# Patient Record
Sex: FEMALE | Race: WHITE | NOT HISPANIC OR LATINO | Employment: OTHER | URBAN - NONMETROPOLITAN AREA
[De-identification: names, ages, dates, MRNs, and addresses within clinical notes are randomized per-mention and may not be internally consistent; named-entity substitution may affect disease eponyms.]

---

## 2017-01-01 ENCOUNTER — HOSPITAL ENCOUNTER (OUTPATIENT)
Dept: PHYSICAL THERAPY | Facility: HOSPITAL | Age: 72
Setting detail: THERAPIES SERIES
Discharge: HOME | End: 2017-01-19
Attending: ORTHOPAEDIC SURGERY | Admitting: ORTHOPAEDIC SURGERY

## 2017-01-27 ENCOUNTER — OFFICE VISIT (OUTPATIENT)
Dept: ORTHOPEDIC SURGERY | Facility: CLINIC | Age: 72
End: 2017-01-27

## 2017-01-27 VITALS — WEIGHT: 225 LBS | BODY MASS INDEX: 39.87 KG/M2 | HEIGHT: 63 IN

## 2017-01-27 DIAGNOSIS — Z96.651 STATUS POST TOTAL RIGHT KNEE REPLACEMENT: Primary | ICD-10-CM

## 2017-01-27 PROCEDURE — 99024 POSTOP FOLLOW-UP VISIT: CPT | Performed by: ORTHOPAEDIC SURGERY

## 2017-01-27 NOTE — MR AVS SNAPSHOT
Kelly Alvarez   1/27/2017 10:15 AM   Office Visit    Dept Phone:  [phone]   Encounter #:  [id number]    Provider:  Bonilla Fox MD   Department:  [facility] ORTHOPEDICS                Your Full Care Plan              Today's Medication Changes          These changes are accurate as of: 1/27/17 11:12 AM.  If you have any questions, ask your nurse or doctor.               Stop taking medication(s)listed here:     FLUVIRIN suspension injection   Generic drug:  Influenza Vac Typ A&B Surf Ant   Stopped by:  Bonilla Fox MD           LYRICA 50 MG capsule   Generic drug:  pregabalin   Stopped by:  Bonilla Fox MD           Naloxegol Oxalate 25 MG tablet   Commonly known as:  MOVANTIK   Stopped by:  Bonilla Fox MD           warfarin 3 MG tablet   Commonly known as:  COUMADIN   Stopped by:  Bonilla Fox MD                      Your Updated Medication List          This list is accurate as of: 1/27/17 11:12 AM.  Always use your most recent med list.                amitriptyline 50 MG tablet   Commonly known as:  ELAVIL       aspirin 81 MG chewable tablet       CloNIDine 0.3 MG tablet   Commonly known as:  CATAPRES       COENZYME Q-10 PO       furosemide 40 MG tablet   Commonly known as:  LASIX       gabapentin 300 MG capsule   Commonly known as:  NEURONTIN       HYDROcodone-acetaminophen  MG per tablet   Commonly known as:  NORCO       lisinopril 20 MG tablet   Commonly known as:  PRINIVIL,ZESTRIL       metFORMIN 500 MG tablet   Commonly known as:  GLUCOPHAGE       nystatin-triamcinolone 415837-5.1 UNIT/GM-% cream   Commonly known as:  MYCOLOG II       OMEPRAZOLE PO       PEDIATRIC MULTIVITAMINS-FL PO               You Were Diagnosed With        Codes Comments    Status post total right knee replacement    -  Primary ICD-10-CM: Z96.651  ICD-9-CM: V43.65       Instructions     None    Patient Instructions History      Upcoming Appointments     Visit Type Date  "Time Department    FOLLOW UP 1/27/2017 10:15 AM [facility]THOP[facility]    FOLLOW UP 2/13/2017 11:10 AM [facility] PAIN [facility]    FOLLOW UP 7/25/2017  9:00 AM [facility] ORTHOPEDICS [facility]      MyChart Signup     Our records indicate that you have declined [facility] signup. If you would like to sign up for [facility], please email [email or web address] or call [phone] to obtain an activation code.             Other Info from Your Visit           Your Appointments     Feb 13, 2017 11:10 AM CST   Follow Up with Demar Napier MD   [facility] PAIN MANAGEMENT (--)    [address]  [address] [place] [id number] [phone]           Arrive 15 minutes prior to appointment.            Jul 25, 2017  9:00 AM CDT   Follow Up with Bonilla Fox MD   [facility] ORTHOPEDICS (--)    [address]  [address] [facility] [address] [phone]           Arrive 15 minutes prior to appointment.              Allergies     Augmentin [Amoxicillin-pot Clavulanate]      Codeine      Morphine And Related      Penicillins      Zanaflex [Tizanidine Hcl]        Reason for Visit     Right Knee - Follow-up, Post-op           Vital Signs     Height Weight Body Mass Index Smoking Status          63\" (160 cm) 225 lb (102 kg) 39.86 kg/m2 Former Smoker        Problems and Diagnoses Noted     Status post total right knee replacement        "

## 2017-01-27 NOTE — PROGRESS NOTES
Subjective   Kelly Alvarez is a 71 y.o. female. 1945      History of Present Illness Patient is here for a post op recheck of Right TKA. Patient is S/P Right total knee-11/21/2016. Patient states she is doing well and is not in pain as long as she takes her pain medication    The following portions of the patient's history were reviewed and updated as appropriate:   She  has a past medical history of Diabetes mellitus; Diabetic foot ulcer; Hip pain; Hyperlipidemia; Hypertensive disorder; Hypertensive disorder; Knee pain; Osteoarthritis; and Shoulder pain.  She  does not have any pertinent problems on file.  She  has no past surgical history on file.  Her family history includes Diabetes in her other; Heart disease in her other; Hypertension in her other.  She  reports that she has quit smoking. She does not have any smokeless tobacco history on file. She reports that she does not drink alcohol or use illicit drugs.  Current Outpatient Prescriptions   Medication Sig Dispense Refill   • amitriptyline (ELAVIL) 50 MG tablet Take 50 mg by mouth every night.     • aspirin 81 MG chewable tablet Chew 81 mg daily.     • cloNIDine (CATAPRES) 0.3 MG tablet Take 0.3 mg by mouth 2 (two) times a day.     • COENZYME Q-10 PO Take  by mouth.     • furosemide (LASIX) 40 MG tablet Take 40 mg by mouth every morning.     • gabapentin (NEURONTIN) 300 MG capsule      • HYDROcodone-acetaminophen (NORCO)  MG per tablet Take 1 tablet by mouth 2 (two) times a day.     • lisinopril (PRINIVIL,ZESTRIL) 20 MG tablet Take 40 mg by mouth daily.     • metFORMIN (GLUCOPHAGE) 500 MG tablet Take 500 mg by mouth 2 (two) times a day with meals.     • nystatin-triamcinolone (MYCOLOG II) 523249-9.1 UNIT/GM-% cream      • OMEPRAZOLE PO Take 20 mg by mouth as needed.     • PEDIATRIC MULTIVITAMINS-FL PO Take  by mouth.       No current facility-administered medications for this visit.      Current Outpatient Prescriptions on File Prior to Visit  "  Medication Sig   • amitriptyline (ELAVIL) 50 MG tablet Take 50 mg by mouth every night.   • aspirin 81 MG chewable tablet Chew 81 mg daily.   • cloNIDine (CATAPRES) 0.3 MG tablet Take 0.3 mg by mouth 2 (two) times a day.   • COENZYME Q-10 PO Take  by mouth.   • furosemide (LASIX) 40 MG tablet Take 40 mg by mouth every morning.   • gabapentin (NEURONTIN) 300 MG capsule    • HYDROcodone-acetaminophen (NORCO)  MG per tablet Take 1 tablet by mouth 2 (two) times a day.   • lisinopril (PRINIVIL,ZESTRIL) 20 MG tablet Take 40 mg by mouth daily.   • metFORMIN (GLUCOPHAGE) 500 MG tablet Take 500 mg by mouth 2 (two) times a day with meals.   • nystatin-triamcinolone (MYCOLOG II) 334500-5.1 UNIT/GM-% cream    • OMEPRAZOLE PO Take 20 mg by mouth as needed.   • PEDIATRIC MULTIVITAMINS-FL PO Take  by mouth.   • [DISCONTINUED] FLUVIRIN suspension injection    • [DISCONTINUED] LYRICA 50 MG capsule Take 50 mg by mouth 3 (three) times a day.   • [DISCONTINUED] Naloxegol Oxalate (MOVANTIK) 25 MG tablet Take 1 tablet by mouth daily.   • [DISCONTINUED] warfarin (COUMADIN) 3 MG tablet      No current facility-administered medications on file prior to visit.      She is allergic to augmentin [amoxicillin-pot clavulanate]; codeine; morphine and related; penicillins; and zanaflex [tizanidine hcl]..    Review of Systems  Visit Vitals   • Ht 63\" (160 cm)   • Wt 225 lb (102 kg)   • BMI 39.86 kg/m2       Social History     Social History   • Marital status:      Spouse name: N/A   • Number of children: N/A   • Years of education: N/A     Occupational History   • Not on file.     Social History Main Topics   • Smoking status: Former Smoker   • Smokeless tobacco: Not on file      Comment: Quit about 17 yrs ago 5/13/16   • Alcohol use No   • Drug use: No   • Sexual activity: Defer     Other Topics Concern   • Not on file     Social History Narrative       HEENT: Normocephalic.  PERRLA.  TM's clear bilaterally.  Oropharynx: Clear.  " Neck: Supple, with no adenopathy.  Chest: Equal bilateral expansion.  Clear to auscultation and percussion.  Heart: Regular sinus rhythm, S1 and S2 normal.  No murmurs or extra heart sounds heard.  Abdomen: Soft, nontender, and no organomegaly.  Neurological: cranial nerves II-XII normal Vascular: pulses are present  Dermatological: no rashes  or blemishes, or any abnormality of the skin.      REVIEW OF SYSTEMS:  Negative, other than presenting complaint.  HEENT: No headaches, diplopia, blurred vision, tinnitus, vertigo, epistaxis, hoarseness or sore throat.  Pulmonary: No cough, sputum, hemoptysis, dyspnea, wheezing, or chest pain.  Cardiac: No chest pain, palpitations, orthopnea, paroxysmal nocturnal dyspnea, shortness of breath, or pedal edema.  Gastrointestinal: No diarrhea, melena, or constipation.  Genitourinary: No dysuria, hematuria, nocturia, frequency, bladder or bowel incontinence.  Hematology: No history of any anemia, fatigue, fever, or chills or night sweats.  Dermatology: No rashes, pruritus, or increased pigmentation changes of the skin.   Objective   Physical Examrange is from 0 to 130. Quads: 5/5  Neuro intact. Excellent stability.      Assessment/Plan rt total knee  Improved. See back in  6 months.    Kelly was seen today for follow-up and post-op.    Diagnoses and all orders for this visit:    Status post total right knee replacement

## 2017-03-02 ENCOUNTER — OFFICE VISIT (OUTPATIENT)
Dept: PAIN MEDICINE | Facility: CLINIC | Age: 72
End: 2017-03-02

## 2017-03-02 VITALS
SYSTOLIC BLOOD PRESSURE: 150 MMHG | WEIGHT: 235.1 LBS | DIASTOLIC BLOOD PRESSURE: 98 MMHG | HEIGHT: 63 IN | BODY MASS INDEX: 41.66 KG/M2

## 2017-03-02 DIAGNOSIS — M25.511 CHRONIC RIGHT SHOULDER PAIN: ICD-10-CM

## 2017-03-02 DIAGNOSIS — M25.552 PAIN OF BOTH HIP JOINTS: ICD-10-CM

## 2017-03-02 DIAGNOSIS — M25.551 PAIN OF BOTH HIP JOINTS: ICD-10-CM

## 2017-03-02 DIAGNOSIS — G89.29 CHRONIC PAIN OF BOTH KNEES: Primary | ICD-10-CM

## 2017-03-02 DIAGNOSIS — Z79.899 HIGH RISK MEDICATIONS (NOT ANTICOAGULANTS) LONG-TERM USE: ICD-10-CM

## 2017-03-02 DIAGNOSIS — G89.29 CHRONIC RIGHT SHOULDER PAIN: ICD-10-CM

## 2017-03-02 DIAGNOSIS — M25.561 CHRONIC PAIN OF BOTH KNEES: Primary | ICD-10-CM

## 2017-03-02 DIAGNOSIS — M25.562 CHRONIC PAIN OF BOTH KNEES: Primary | ICD-10-CM

## 2017-03-02 PROCEDURE — 99213 OFFICE O/P EST LOW 20 MIN: CPT | Performed by: PAIN MEDICINE

## 2017-03-02 RX ORDER — HYDROCODONE BITARTRATE AND ACETAMINOPHEN 10; 325 MG/1; MG/1
1 TABLET ORAL 4 TIMES DAILY
Qty: 120 TABLET | Refills: 0 | Status: SHIPPED | OUTPATIENT
Start: 2017-03-02 | End: 2017-04-01

## 2017-03-02 NOTE — PROGRESS NOTES
Kelly Alvarez is a 71 y.o. female.   1945    HPI:   Location: bilateral hip, bilateral knee and bilateral foot  Quality: aching, sharp, dull and no tingling  Severity: 5-6/10  Timing: constant  Alleviating: pain medication  Aggravating: increased activity     Still recovering from tkr in November 16.  Doing well.  Patient reports that the opioid medication still provides her good relief and allows increased activity than she would have without the opioid medication.  She denies side effects.  The right shoulder is still bothersome, Dr. Fox is following.        The following portions of the patient's history were reviewed by me and updated as appropriate: allergies, current medications, past family history, past medical history, past social history, past surgical history and problem list.    Past Medical History   Diagnosis Date   • Diabetes mellitus    • Diabetic foot ulcer      Normal PRIYA      • Hip pain    • Hyperlipidemia    • Hypertensive disorder    • Hypertensive disorder    • Knee pain    • Osteoarthritis    • Shoulder pain        Social History     Social History   • Marital status:      Spouse name: N/A   • Number of children: N/A   • Years of education: N/A     Occupational History   • Not on file.     Social History Main Topics   • Smoking status: Former Smoker   • Smokeless tobacco: Not on file      Comment: Quit about 17 yrs ago 5/13/16   • Alcohol use No   • Drug use: No   • Sexual activity: Defer     Other Topics Concern   • Not on file     Social History Narrative       Family History   Problem Relation Age of Onset   • Diabetes Other    • Heart disease Other    • Hypertension Other          Current Outpatient Prescriptions:   •  amitriptyline (ELAVIL) 50 MG tablet, Take 50 mg by mouth every night., Disp: , Rfl:   •  aspirin 81 MG chewable tablet, Chew 81 mg daily., Disp: , Rfl:   •  cloNIDine (CATAPRES) 0.3 MG tablet, Take 0.3 mg by mouth 2 (two) times a day., Disp: , Rfl:   •   COENZYME Q-10 PO, Take  by mouth., Disp: , Rfl:   •  furosemide (LASIX) 40 MG tablet, Take 40 mg by mouth every morning., Disp: , Rfl:   •  gabapentin (NEURONTIN) 300 MG capsule, , Disp: , Rfl:   •  HYDROcodone-acetaminophen (NORCO)  MG per tablet, Take 1 tablet by mouth 2 (two) times a day., Disp: , Rfl:   •  lisinopril (PRINIVIL,ZESTRIL) 20 MG tablet, Take 40 mg by mouth daily., Disp: , Rfl:   •  metFORMIN (GLUCOPHAGE) 500 MG tablet, Take 500 mg by mouth 2 (two) times a day with meals., Disp: , Rfl:   •  nystatin-triamcinolone (MYCOLOG II) 248337-2.1 UNIT/GM-% cream, , Disp: , Rfl:   •  OMEPRAZOLE PO, Take 20 mg by mouth as needed., Disp: , Rfl:   •  PEDIATRIC MULTIVITAMINS-FL PO, Take  by mouth., Disp: , Rfl:   •  HYDROcodone-acetaminophen (NORCO)  MG per tablet, Take 1 tablet by mouth 4 (Four) Times a Day for 30 days., Disp: 120 tablet, Rfl: 0  •  HYDROcodone-acetaminophen (NORCO)  MG per tablet, Take 1 tablet by mouth 4 (Four) Times a Day for 30 days., Disp: 120 tablet, Rfl: 0    Allergies   Allergen Reactions   • Augmentin [Amoxicillin-Pot Clavulanate]    • Codeine    • Morphine And Related    • Penicillins    • Zanaflex [Tizanidine Hcl]          Review of Systems   Musculoskeletal:        Bilateral hip pain  Bilateral knee pain  Bilateral foot pain       10 system review of systems was reviewed and negative except for above.    Physical Exam   Constitutional: She appears well-developed and well-nourished. No distress.   Musculoskeletal:        Right shoulder: She exhibits decreased range of motion (decr arom right shoulder with pain).   Painful arom b knees.   Neurological: She is alert.   Cane for ambulation   Psychiatric: She has a normal mood and affect. Her behavior is normal. Judgment normal.       Kelly was seen today for shoulder pain, foot pain, knee pain and hip pain.    Diagnoses and all orders for this visit:    Chronic pain of both knees    Chronic right shoulder pain    Pain of  both hip joints    High risk medications (not anticoagulants) long-term use    Other orders  -     HYDROcodone-acetaminophen (NORCO)  MG per tablet; Take 1 tablet by mouth 4 (Four) Times a Day for 30 days.  -     HYDROcodone-acetaminophen (NORCO)  MG per tablet; Take 1 tablet by mouth 4 (Four) Times a Day for 30 days.        Medication: Patient reports no negative side effects, Patient reports appropriate usage and storage habits, Patient's opioid provides enough reflief to be more active and perform activities of daily living with less discomfort. and Refill opioid medication as above.     Interventional: none at this time.  Pt reports Dr. Fox is going to be evaluating r shoulder soon.    Rehab: none at this time    Behavioral: No aberrant behavior noted. [facility] Report #[id number]  was reviewed and is consistent with stated history    Urine drug screen None at this time          This document has been electronically signed by Demar Napier MD on March 2, 2017 9:08 AM

## 2017-04-26 ENCOUNTER — OFFICE VISIT (OUTPATIENT)
Dept: PAIN MEDICINE | Facility: CLINIC | Age: 72
End: 2017-04-26

## 2017-04-26 VITALS
DIASTOLIC BLOOD PRESSURE: 100 MMHG | BODY MASS INDEX: 41.55 KG/M2 | WEIGHT: 234.5 LBS | SYSTOLIC BLOOD PRESSURE: 180 MMHG | HEIGHT: 63 IN

## 2017-04-26 DIAGNOSIS — M25.561 CHRONIC PAIN OF BOTH KNEES: Primary | ICD-10-CM

## 2017-04-26 DIAGNOSIS — M25.551 PAIN OF BOTH HIP JOINTS: ICD-10-CM

## 2017-04-26 DIAGNOSIS — M25.552 PAIN OF BOTH HIP JOINTS: ICD-10-CM

## 2017-04-26 DIAGNOSIS — G89.29 CHRONIC PAIN OF BOTH KNEES: Primary | ICD-10-CM

## 2017-04-26 DIAGNOSIS — M25.511 RIGHT SHOULDER PAIN, UNSPECIFIED CHRONICITY: ICD-10-CM

## 2017-04-26 DIAGNOSIS — Z79.899 HIGH RISK MEDICATIONS (NOT ANTICOAGULANTS) LONG-TERM USE: ICD-10-CM

## 2017-04-26 DIAGNOSIS — M25.562 CHRONIC PAIN OF BOTH KNEES: Primary | ICD-10-CM

## 2017-04-26 PROCEDURE — 99213 OFFICE O/P EST LOW 20 MIN: CPT | Performed by: NURSE PRACTITIONER

## 2017-04-26 NOTE — PROGRESS NOTES
Kelly Alvarez is a 71 y.o. female.   1945    HPI:   Location: bilateral hip, bilateral knee and bilateral foot  Quality: aching, sharp and dull  Severity: 6/10  Timing: constant  Alleviating: pain medication  Aggravating: increased activity     Pt states Dr. Fox will be evaluating right shoulder in July. Pt remains recovering from TRK replacement in NOV 2016. Opiate medications provides enough relief for daily activity and ambulation. NO SE. Pt happy with pain management visit and regimen.           The following portions of the patient's history were reviewed by me and updated as appropriate: allergies, current medications, past family history, past medical history, past social history, past surgical history and problem list.    Past Medical History:   Diagnosis Date   • Diabetes mellitus    • Diabetic foot ulcer     Normal PRIYA      • Hip pain    • Hyperlipidemia    • Hypertensive disorder    • Hypertensive disorder    • Knee pain    • Osteoarthritis    • Shoulder pain        Social History     Social History   • Marital status:      Spouse name: N/A   • Number of children: N/A   • Years of education: N/A     Occupational History   • Not on file.     Social History Main Topics   • Smoking status: Former Smoker   • Smokeless tobacco: Never Used      Comment: Quit about 17 yrs ago 5/13/16   • Alcohol use No   • Drug use: No   • Sexual activity: Defer     Other Topics Concern   • Not on file     Social History Narrative       Family History   Problem Relation Age of Onset   • Diabetes Other    • Heart disease Other    • Hypertension Other          Current Outpatient Prescriptions:   •  amitriptyline (ELAVIL) 50 MG tablet, Take 50 mg by mouth every night., Disp: , Rfl:   •  aspirin 81 MG chewable tablet, Chew 81 mg daily., Disp: , Rfl:   •  cloNIDine (CATAPRES) 0.3 MG tablet, Take 0.3 mg by mouth 2 (two) times a day., Disp: , Rfl:   •  COENZYME Q-10 PO, Take  by mouth., Disp: , Rfl:   •  furosemide  (LASIX) 40 MG tablet, Take 40 mg by mouth every morning., Disp: , Rfl:   •  gabapentin (NEURONTIN) 300 MG capsule, , Disp: , Rfl:   •  HYDROcodone-acetaminophen (NORCO)  MG per tablet, Take 1 tablet by mouth 2 (two) times a day., Disp: , Rfl:   •  lisinopril (PRINIVIL,ZESTRIL) 20 MG tablet, Take 40 mg by mouth daily., Disp: , Rfl:   •  metFORMIN (GLUCOPHAGE) 500 MG tablet, Take 500 mg by mouth 2 (two) times a day with meals., Disp: , Rfl:   •  nystatin-triamcinolone (MYCOLOG II) 742469-3.1 UNIT/GM-% cream, , Disp: , Rfl:   •  OMEPRAZOLE PO, Take 20 mg by mouth as needed., Disp: , Rfl:   •  PEDIATRIC MULTIVITAMINS-FL PO, Take  by mouth., Disp: , Rfl:     Allergies   Allergen Reactions   • Augmentin [Amoxicillin-Pot Clavulanate]    • Codeine    • Morphine And Related    • Penicillins    • Zanaflex [Tizanidine Hcl]          Review of Systems   Musculoskeletal:        B.hip,b.knee,b.foot     10 system review of systems was reviewed and negative except for above.    Physical Exam   Constitutional: She is oriented to person, place, and time. She appears well-developed and well-nourished. No distress.   Musculoskeletal:        Right shoulder: She exhibits decreased range of motion ( less than 90 deg abd) and tenderness.        Right knee: She exhibits normal range of motion. Tenderness found.        Left knee: She exhibits normal range of motion. Tenderness found.   Painful arom b knees.   Neurological: She is alert and oriented to person, place, and time. Gait ( cane for ambulation) abnormal.   Cane for ambulation   Skin: Skin is warm and dry.   Psychiatric: She has a normal mood and affect. Her behavior is normal. Judgment normal. She expresses no homicidal and no suicidal ideation.   Nursing note and vitals reviewed.      Kelly was seen today for pain.    Diagnoses and all orders for this visit:    Chronic pain of both knees    Right shoulder pain, unspecified chronicity    Pain of both hip joints    High risk  medications (not anticoagulants) long-term use        Medication: Patient reports no negative side effects, Patient reports appropriate usage and storage habits and Patient's opioid provides enough reflief to be more active and perform activities of daily living with less discomfort. Norco 10 mg qid. Discussed case with Dariel Napier, opiate medication refilled ×2 hand written scripts.      Interventional: Dr. Fox will follow up with right shoulder.    Rehab: Activity at home outside with walker.     Behavioral: No aberrant behavior noted. [facility] Report # [id number]  was reviewed and is consistent with stated history    Urine drug screen None at this time. UDS next visit.           This document has been electronically signed by MARGARITO Yin on April 26, 2017 6:36 PM          This document has been electronically signed by MARGARITO Yin on April 26, 2017 6:36 PM

## 2017-06-26 ENCOUNTER — OFFICE VISIT (OUTPATIENT)
Dept: PAIN MEDICINE | Facility: CLINIC | Age: 72
End: 2017-06-26

## 2017-06-26 ENCOUNTER — APPOINTMENT (OUTPATIENT)
Dept: LAB | Facility: HOSPITAL | Age: 72
End: 2017-06-26

## 2017-06-26 VITALS
WEIGHT: 230.9 LBS | HEIGHT: 64 IN | SYSTOLIC BLOOD PRESSURE: 160 MMHG | BODY MASS INDEX: 39.42 KG/M2 | DIASTOLIC BLOOD PRESSURE: 90 MMHG

## 2017-06-26 DIAGNOSIS — T40.2X5A THERAPEUTIC OPIOID INDUCED CONSTIPATION: ICD-10-CM

## 2017-06-26 DIAGNOSIS — G89.29 CHRONIC PAIN OF LEFT KNEE: Primary | ICD-10-CM

## 2017-06-26 DIAGNOSIS — M25.511 CHRONIC RIGHT SHOULDER PAIN: ICD-10-CM

## 2017-06-26 DIAGNOSIS — E66.01 MORBID OBESITY DUE TO EXCESS CALORIES (HCC): ICD-10-CM

## 2017-06-26 DIAGNOSIS — M25.562 CHRONIC PAIN OF LEFT KNEE: Primary | ICD-10-CM

## 2017-06-26 DIAGNOSIS — K59.03 THERAPEUTIC OPIOID INDUCED CONSTIPATION: ICD-10-CM

## 2017-06-26 DIAGNOSIS — G89.29 CHRONIC RIGHT SHOULDER PAIN: ICD-10-CM

## 2017-06-26 PROCEDURE — 80307 DRUG TEST PRSMV CHEM ANLYZR: CPT | Performed by: PAIN MEDICINE

## 2017-06-26 PROCEDURE — G0481 DRUG TEST DEF 8-14 CLASSES: HCPCS | Performed by: PAIN MEDICINE

## 2017-06-26 PROCEDURE — 99214 OFFICE O/P EST MOD 30 MIN: CPT | Performed by: PAIN MEDICINE

## 2017-06-26 RX ORDER — HYDROCODONE BITARTRATE AND ACETAMINOPHEN 10; 325 MG/1; MG/1
1 TABLET ORAL 4 TIMES DAILY
Qty: 120 TABLET | Refills: 0 | Status: SHIPPED | OUTPATIENT
Start: 2017-06-26 | End: 2017-07-26

## 2017-06-26 RX ORDER — OMEPRAZOLE 20 MG/1
CAPSULE, DELAYED RELEASE ORAL
COMMUNITY
Start: 2017-05-31

## 2017-06-26 RX ORDER — LEVOFLOXACIN 500 MG/1
TABLET, FILM COATED ORAL
COMMUNITY
Start: 2017-05-31 | End: 2019-05-20

## 2017-06-26 NOTE — PROGRESS NOTES
Kelly Alvarez is a 72 y.o. female.   1945    HPI:   Location: bilateral shoulder, bilateral hip, bilateral knee and right foot  Quality: aching, sharp and dull  Severity: 6/10  Timing: constant  Alleviating: pain medication  Aggravating: increased activity      TKR from November doing well.  Is trying to wait before having the left one done.  Patient reports that the opioid medication still provides her good relief and allows increased activity than she would have without the opioid medication.  She denies side effects, other than constipation managed with diet and otc meds, not perfectly however.      The following portions of the patient's history were reviewed by me and updated as appropriate: allergies, current medications, past family history, past medical history, past social history, past surgical history and problem list.    Past Medical History:   Diagnosis Date   • Diabetes mellitus    • Diabetic foot ulcer     Normal PRIYA      • Hip pain    • Hyperlipidemia    • Hypertensive disorder    • Hypertensive disorder    • Knee pain    • Osteoarthritis    • Shoulder pain        Social History     Social History   • Marital status:      Spouse name: N/A   • Number of children: N/A   • Years of education: N/A     Occupational History   • Not on file.     Social History Main Topics   • Smoking status: Former Smoker   • Smokeless tobacco: Never Used      Comment: Quit about 17 yrs ago 5/13/16   • Alcohol use No   • Drug use: No   • Sexual activity: Defer     Other Topics Concern   • Not on file     Social History Narrative       Family History   Problem Relation Age of Onset   • Diabetes Other    • Heart disease Other    • Hypertension Other          Current Outpatient Prescriptions:   •  amitriptyline (ELAVIL) 50 MG tablet, Take 50 mg by mouth every night., Disp: , Rfl:   •  aspirin 81 MG chewable tablet, Chew 81 mg daily., Disp: , Rfl:   •  cloNIDine (CATAPRES) 0.3 MG tablet, Take 0.3 mg by mouth 2  (two) times a day., Disp: , Rfl:   •  COENZYME Q-10 PO, Take  by mouth., Disp: , Rfl:   •  furosemide (LASIX) 40 MG tablet, Take 40 mg by mouth every morning., Disp: , Rfl:   •  gabapentin (NEURONTIN) 300 MG capsule, , Disp: , Rfl:   •  HYDROcodone-acetaminophen (NORCO)  MG per tablet, Take 1 tablet by mouth 2 (two) times a day., Disp: , Rfl:   •  levoFLOXacin (LEVAQUIN) 500 MG tablet, , Disp: , Rfl:   •  lisinopril (PRINIVIL,ZESTRIL) 20 MG tablet, Take 40 mg by mouth daily., Disp: , Rfl:   •  metFORMIN (GLUCOPHAGE) 1000 MG tablet, , Disp: , Rfl:   •  metFORMIN (GLUCOPHAGE) 500 MG tablet, Take 500 mg by mouth 2 (two) times a day with meals., Disp: , Rfl:   •  nystatin-triamcinolone (MYCOLOG II) 357960-4.1 UNIT/GM-% cream, , Disp: , Rfl:   •  omeprazole (priLOSEC) 20 MG capsule, , Disp: , Rfl:   •  PEDIATRIC MULTIVITAMINS-FL PO, Take  by mouth., Disp: , Rfl:   •  HYDROcodone-acetaminophen (NORCO)  MG per tablet, Take 1 tablet by mouth 4 (Four) Times a Day for 30 days., Disp: 120 tablet, Rfl: 0  •  HYDROcodone-acetaminophen (NORCO)  MG per tablet, Take 1 tablet by mouth 4 (Four) Times a Day for 30 days., Disp: 120 tablet, Rfl: 0  •  Naloxegol Oxalate (MOVANTIK) 25 MG tablet, Take 1 tablet by mouth Daily., Disp: 30 tablet, Rfl: 1    Allergies   Allergen Reactions   • Augmentin [Amoxicillin-Pot Clavulanate]    • Codeine    • Morphine And Related    • Penicillins    • Zanaflex [Tizanidine Hcl]        Review of Systems   Musculoskeletal:        Bilateral hip knee foot pain right shoulder pain   All other systems reviewed and are negative.    All systems reviewed and negative except for above.    Physical Exam   Constitutional: She appears well-developed and well-nourished. No distress.   Musculoskeletal:        Right shoulder: She exhibits decreased range of motion (decr arom right shoulder with pain abduction only abou 20 percent).   Painful arom l knee.   Neurological: She is alert.   Cane for  ambulation   Psychiatric: She has a normal mood and affect. Her behavior is normal. Judgment normal.       Kelly was seen today for hip pain, knee pain, foot pain and shoulder pain.    Diagnoses and all orders for this visit:    Chronic pain of left knee  -     ToxASSURE Select 13 (MW)    Chronic right shoulder pain  -     ToxASSURE Select 13 (MW)    Morbid obesity due to excess calories  -     ToxASSURE Select 13 (MW)    Therapeutic opioid induced constipation    Other orders  -     HYDROcodone-acetaminophen (NORCO)  MG per tablet; Take 1 tablet by mouth 4 (Four) Times a Day for 30 days.  -     HYDROcodone-acetaminophen (NORCO)  MG per tablet; Take 1 tablet by mouth 4 (Four) Times a Day for 30 days.  -     Naloxegol Oxalate (MOVANTIK) 25 MG tablet; Take 1 tablet by mouth Daily.        Medication: Patient reports appropriate usage and storage habits, Patient's opioid provides enough reflief to be more active and perform activities of daily living with less discomfort. and Refill opioid medication as above.  Constipation managed moderately with otc meds, would like to have movantik prescribed.  Has taken samples in past with good relief.  Has tried multiple otc meds with only modest result.  Miralax, colace, dulcolax have all been tried.     Interventional: none at this time.  Following with Dr. Fox regarding right shoulder and left knee at this point.     Rehab: none at this time    Behavioral: No aberrant behavior noted. [facility] Report #[id number]  was reviewed and is consistent with stated history    Urine drug screen Ordered today to test for drugs of abuse and prescribed medications          This document has been electronically signed by Demar Napier MD on June 26, 2017 11:28 AM

## 2017-07-04 LAB — CONV REPORT SUMMARY: NORMAL

## 2017-07-25 ENCOUNTER — OFFICE VISIT (OUTPATIENT)
Dept: ORTHOPEDIC SURGERY | Facility: CLINIC | Age: 72
End: 2017-07-25

## 2017-07-25 VITALS — WEIGHT: 225 LBS | HEIGHT: 63 IN | BODY MASS INDEX: 39.87 KG/M2

## 2017-07-25 DIAGNOSIS — Z96.651 STATUS POST TOTAL RIGHT KNEE REPLACEMENT: Primary | ICD-10-CM

## 2017-07-25 DIAGNOSIS — M17.12 PRIMARY OSTEOARTHRITIS OF LEFT KNEE: ICD-10-CM

## 2017-07-25 PROCEDURE — 99213 OFFICE O/P EST LOW 20 MIN: CPT | Performed by: ORTHOPAEDIC SURGERY

## 2017-07-25 NOTE — PROGRESS NOTES
Kelly Alvarez is a 72 y.o. female returns for     Chief Complaint   Patient presents with   • Right Knee - Follow-up       HISTORY OF PRESENT ILLNESS: Patient is S/P right TKA and doing well. Last Wednesday , while getting out of the shower, she felt her left total hip dislocate, and reduce itself , this is the first  Time this has happened since she had the hip revise in [place]  Several years ago , she is also having severe pain in the left knee , x rays done last December show severe OA of all three compartments of the left knee.         CONCURRENT MEDICAL HISTORY:    Past Medical History:   Diagnosis Date   • Diabetes mellitus    • Diabetic foot ulcer     Normal PRIYA      • Hip pain    • Hyperlipidemia    • Hypertensive disorder    • Hypertensive disorder    • Knee pain    • Osteoarthritis    • Shoulder pain        Allergies   Allergen Reactions   • Augmentin [Amoxicillin-Pot Clavulanate]    • Codeine    • Morphine And Related    • Penicillins    • Zanaflex [Tizanidine Hcl]          Current Outpatient Prescriptions:   •  amitriptyline (ELAVIL) 50 MG tablet, Take 50 mg by mouth every night., Disp: , Rfl:   •  aspirin 81 MG chewable tablet, Chew 81 mg daily., Disp: , Rfl:   •  cloNIDine (CATAPRES) 0.3 MG tablet, Take 0.3 mg by mouth 2 (two) times a day., Disp: , Rfl:   •  COENZYME Q-10 PO, Take  by mouth., Disp: , Rfl:   •  furosemide (LASIX) 40 MG tablet, Take 40 mg by mouth every morning., Disp: , Rfl:   •  gabapentin (NEURONTIN) 300 MG capsule, , Disp: , Rfl:   •  HYDROcodone-acetaminophen (NORCO)  MG per tablet, Take 1 tablet by mouth 2 (two) times a day., Disp: , Rfl:   •  HYDROcodone-acetaminophen (NORCO)  MG per tablet, Take 1 tablet by mouth 4 (Four) Times a Day for 30 days., Disp: 120 tablet, Rfl: 0  •  HYDROcodone-acetaminophen (NORCO)  MG per tablet, Take 1 tablet by mouth 4 (Four) Times a Day for 30 days., Disp: 120 tablet, Rfl: 0  •  levoFLOXacin (LEVAQUIN) 500 MG tablet, ,  "Disp: , Rfl:   •  lisinopril (PRINIVIL,ZESTRIL) 20 MG tablet, Take 40 mg by mouth daily., Disp: , Rfl:   •  metFORMIN (GLUCOPHAGE) 1000 MG tablet, , Disp: , Rfl:   •  metFORMIN (GLUCOPHAGE) 500 MG tablet, Take 500 mg by mouth 2 (two) times a day with meals., Disp: , Rfl:   •  Naloxegol Oxalate (MOVANTIK) 25 MG tablet, Take 1 tablet by mouth Daily., Disp: 30 tablet, Rfl: 1  •  nystatin-triamcinolone (MYCOLOG II) 688285-5.1 UNIT/GM-% cream, , Disp: , Rfl:   •  omeprazole (priLOSEC) 20 MG capsule, , Disp: , Rfl:   •  PEDIATRIC MULTIVITAMINS-FL PO, Take  by mouth., Disp: , Rfl:     No past surgical history on file.    ROS  No fevers or chills.  No chest pain or shortness of air.  No GI or  disturbances.    PHYSICAL EXAMINATION:       Ht 63\" (160 cm)  Wt 225 lb (102 kg)  BMI 39.86 kg/m2    Physical Exam     Ht 63\" (160 cm)  Wt 225 lb (102 kg)  BMI 39.86 kg/m2    Social History     Social History   • Marital status:      Spouse name: N/A   • Number of children: N/A   • Years of education: N/A     Occupational History   • Not on file.     Social History Main Topics   • Smoking status: Former Smoker   • Smokeless tobacco: Never Used      Comment: Quit about 17 yrs ago 5/13/16   • Alcohol use No   • Drug use: No   • Sexual activity: Defer     Other Topics Concern   • Not on file     Social History Narrative       HEENT: Normocephalic.  PERRLA.  TM's clear bilaterally.  Oropharynx: Clear.  Neck: Supple, with no adenopathy.  Chest: Equal bilateral expansion.  Clear to auscultation and percussion.  Heart: Regular sinus rhythm, S1 and S2 normal.  No murmurs or extra heart sounds heard.  Abdomen: Soft, nontender, and no organomegaly.  Neurological: cranial nerves II-XII normal Vascular: pulses are present  Dermatological: no rashes  or blemishes, or any abnormality of the skin.        GAIT:     []  Normal  [x]  Antalgic    Assistive device: []  None  []  Walker     []  Crutches  [x]  Cane     []  Wheelchair  []  " Stretcher    Ortho Exam  Severe arthrtitis  Changes  Of the left knee on x ray taken on 12/6/16. Severe crepitus on range of motion  Of the knee from 0 to 100 passive range of the left hip is painful on abduction and external rotation of the  Left hip  Quads: 4/5 neuro intact.        No results found.          ASSESSMENT: severe OA of the left knee.  Symptomatic left total hip with instance of apparent subluxation last week, plan: pre-cert for Synvisc injection of the left knee and  ssget x rays of the left hip today. See in 10 days after pre-cert is done.      Diagnoses and all orders for this visit:    Status post total right knee replacement  -     XR Hip With or Without Pelvis 2 - 3 View Left  -     XR Knee 4+ View Left    Primary osteoarthritis of left knee          PLAN    No Follow-up on file.    Bonilla Fox MD

## 2017-08-08 ENCOUNTER — OFFICE VISIT (OUTPATIENT)
Dept: ORTHOPEDIC SURGERY | Facility: CLINIC | Age: 72
End: 2017-08-08

## 2017-08-08 VITALS
BODY MASS INDEX: 36.32 KG/M2 | SYSTOLIC BLOOD PRESSURE: 148 MMHG | WEIGHT: 226 LBS | HEIGHT: 66 IN | DIASTOLIC BLOOD PRESSURE: 74 MMHG

## 2017-08-08 DIAGNOSIS — M75.41 SHOULDER IMPINGEMENT, RIGHT: Primary | ICD-10-CM

## 2017-08-08 PROCEDURE — 99213 OFFICE O/P EST LOW 20 MIN: CPT | Performed by: ORTHOPAEDIC SURGERY

## 2017-08-08 PROCEDURE — 20610 DRAIN/INJ JOINT/BURSA W/O US: CPT | Performed by: ORTHOPAEDIC SURGERY

## 2017-08-08 RX ORDER — TRIAMCINOLONE ACETONIDE 40 MG/ML
40 INJECTION, SUSPENSION INTRA-ARTICULAR; INTRAMUSCULAR ONCE
Status: COMPLETED | OUTPATIENT
Start: 2017-08-08 | End: 2017-08-08

## 2017-08-08 RX ORDER — BUPIVACAINE HYDROCHLORIDE 2.5 MG/ML
5 INJECTION, SOLUTION EPIDURAL; INFILTRATION; INTRACAUDAL ONCE
Status: COMPLETED | OUTPATIENT
Start: 2017-08-08 | End: 2017-08-08

## 2017-08-08 RX ADMIN — BUPIVACAINE HYDROCHLORIDE 5 ML: 2.5 INJECTION, SOLUTION EPIDURAL; INFILTRATION; INTRACAUDAL at 09:49

## 2017-08-08 RX ADMIN — TRIAMCINOLONE ACETONIDE 40 MG: 40 INJECTION, SUSPENSION INTRA-ARTICULAR; INTRAMUSCULAR at 09:49

## 2017-08-08 NOTE — PROGRESS NOTES
"Subjective   Kelly Alvarez is a 72 y.o. female. 1945- Right shoulder/knee pain      History of Present Illness   Patient is here with right shoulder and right knee pain. Patient states she has had severe pain to her right shoulder for \"quite awhile now\". Patient was suppose to be pre-cered for SYNVISC ONE- right knee to receive the injection during today's office visit. Unfortunately, the approval has not been yet obtained. Patient has requested a steroid injection to her right shoulder.     The following portions of the patient's history were reviewed and updated as appropriate:   She  has a past medical history of Diabetes mellitus; Diabetic foot ulcer; Hip pain; Hyperlipidemia; Hypertensive disorder; Hypertensive disorder; Knee pain; Osteoarthritis; and Shoulder pain.  She  does not have any pertinent problems on file.  She  has no past surgical history on file.  Her family history includes Diabetes in her other; Heart disease in her other; Hypertension in her other.  She  reports that she has quit smoking. She has never used smokeless tobacco. She reports that she does not drink alcohol or use illicit drugs.  Current Outpatient Prescriptions   Medication Sig Dispense Refill   • amitriptyline (ELAVIL) 50 MG tablet Take 50 mg by mouth every night.     • aspirin 81 MG chewable tablet Chew 81 mg daily.     • cloNIDine (CATAPRES) 0.3 MG tablet Take 0.3 mg by mouth 2 (two) times a day.     • COENZYME Q-10 PO Take  by mouth.     • furosemide (LASIX) 40 MG tablet Take 40 mg by mouth every morning.     • gabapentin (NEURONTIN) 300 MG capsule      • HYDROcodone-acetaminophen (NORCO)  MG per tablet Take 1 tablet by mouth 2 (two) times a day.     • levoFLOXacin (LEVAQUIN) 500 MG tablet      • lisinopril (PRINIVIL,ZESTRIL) 20 MG tablet Take 40 mg by mouth daily.     • metFORMIN (GLUCOPHAGE) 1000 MG tablet      • metFORMIN (GLUCOPHAGE) 500 MG tablet Take 500 mg by mouth 2 (two) times a day with meals.     • " Naloxegol Oxalate (MOVANTIK) 25 MG tablet Take 1 tablet by mouth Daily. 30 tablet 1   • nystatin-triamcinolone (MYCOLOG II) 403442-8.1 UNIT/GM-% cream      • omeprazole (priLOSEC) 20 MG capsule      • PEDIATRIC MULTIVITAMINS-FL PO Take  by mouth.       Current Facility-Administered Medications   Medication Dose Route Frequency Provider Last Rate Last Dose   • bupivacaine PF (MARCAINE) 0.25 % injection 5 mL  5 mL Injection Once Bonilla Fox MD       • triamcinolone acetonide (KENALOG-40) injection 40 mg  40 mg Intramuscular Once Bonilla Fox MD         Current Outpatient Prescriptions on File Prior to Visit   Medication Sig   • amitriptyline (ELAVIL) 50 MG tablet Take 50 mg by mouth every night.   • aspirin 81 MG chewable tablet Chew 81 mg daily.   • cloNIDine (CATAPRES) 0.3 MG tablet Take 0.3 mg by mouth 2 (two) times a day.   • COENZYME Q-10 PO Take  by mouth.   • furosemide (LASIX) 40 MG tablet Take 40 mg by mouth every morning.   • gabapentin (NEURONTIN) 300 MG capsule    • HYDROcodone-acetaminophen (NORCO)  MG per tablet Take 1 tablet by mouth 2 (two) times a day.   • levoFLOXacin (LEVAQUIN) 500 MG tablet    • lisinopril (PRINIVIL,ZESTRIL) 20 MG tablet Take 40 mg by mouth daily.   • metFORMIN (GLUCOPHAGE) 1000 MG tablet    • metFORMIN (GLUCOPHAGE) 500 MG tablet Take 500 mg by mouth 2 (two) times a day with meals.   • Naloxegol Oxalate (MOVANTIK) 25 MG tablet Take 1 tablet by mouth Daily.   • nystatin-triamcinolone (MYCOLOG II) 691367-9.1 UNIT/GM-% cream    • omeprazole (priLOSEC) 20 MG capsule    • PEDIATRIC MULTIVITAMINS-FL PO Take  by mouth.     No current facility-administered medications on file prior to visit.      She is allergic to augmentin [amoxicillin-pot clavulanate]; codeine; morphine and related; penicillins; and zanaflex [tizanidine hcl]..    Review of Systems  REVIEW OF SYSTEMS:  Negative, other than presenting complaint.  HEENT: No headaches, diplopia, blurred vision, tinnitus, vertigo,  "epistaxis, hoarseness or sore throat.  Pulmonary: No cough, sputum, hemoptysis, dyspnea, wheezing, or chest pain.  Cardiac: No chest pain, palpitations, orthopnea, paroxysmal nocturnal dyspnea, shortness of breath, or pedal edema.  Gastrointestinal: No diarrhea, melena, or constipation.  Genitourinary: No dysuria, hematuria, nocturia, frequency, bladder or bowel incontinence.  Hematology: No history of any anemia, fatigue, fever, or chills or night sweats.  Dermatology: No rashes, pruritus, or increased pigmentation changes of the skin.     Objective   Physical Exam  /74 (BP Location: Left arm, Patient Position: Sitting)  Ht 66\" (167.6 cm)  Wt 226 lb (103 kg)  BMI 36.48 kg/m2    Social History     Social History   • Marital status:      Spouse name: N/A   • Number of children: N/A   • Years of education: N/A     Occupational History   • Not on file.     Social History Main Topics   • Smoking status: Former Smoker   • Smokeless tobacco: Never Used      Comment: Quit about 17 yrs ago 5/13/16   • Alcohol use No   • Drug use: No   • Sexual activity: Defer     Other Topics Concern   • Not on file     Social History Narrative       HEENT: Normocephalic.  PERRLA.  TM's clear bilaterally.  Oropharynx: Clear.  Neck: Supple, with no adenopathy.  Chest: Equal bilateral expansion.  Clear to auscultation and percussion.  Heart: Regular sinus rhythm, S1 and S2 normal.  No murmurs or extra heart sounds heard.  Abdomen: Soft, nontender, and no organomegaly.  Neurological: cranial nerves II-XII normal Vascular: pulses are present  Dermatological: no rashes  or blemishes, or any abnormality of the skin.    Examination of right shoulder shows mild swelling. There is pain upon range of motion.     Allergies verified. Area to the right shoulder was prepped in a sterile manner. Kenalog/marcaine was injected into the right shoulder without difficulty. Band aid was applied over the injection site. Patient was instructed to " ice right shoulder for the next 48 hours.       Once approval has been obtained for SYNVISC ONE injection to the right knee. Patient will be contacted to schedule appointment.   Assessment/Plan   Problems Addressed this Visit     None      Visit Diagnoses     Shoulder impingement, right    -  Primary    Relevant Medications    triamcinolone acetonide (KENALOG-40) injection 40 mg (Start on 8/8/2017 10:15 AM)    bupivacaine PF (MARCAINE) 0.25 % injection 5 mL (Start on 8/8/2017 10:15 AM)

## 2017-08-24 ENCOUNTER — CLINICAL SUPPORT (OUTPATIENT)
Dept: ORTHOPEDIC SURGERY | Facility: CLINIC | Age: 72
End: 2017-08-24

## 2017-08-24 VITALS
SYSTOLIC BLOOD PRESSURE: 128 MMHG | WEIGHT: 226 LBS | BODY MASS INDEX: 36.32 KG/M2 | DIASTOLIC BLOOD PRESSURE: 74 MMHG | HEIGHT: 66 IN

## 2017-08-24 DIAGNOSIS — M25.562 CHRONIC PAIN OF BOTH KNEES: Primary | ICD-10-CM

## 2017-08-24 DIAGNOSIS — M17.12 PRIMARY OSTEOARTHRITIS OF LEFT KNEE: ICD-10-CM

## 2017-08-24 DIAGNOSIS — M25.561 CHRONIC PAIN OF BOTH KNEES: Primary | ICD-10-CM

## 2017-08-24 DIAGNOSIS — G89.29 CHRONIC PAIN OF BOTH KNEES: Primary | ICD-10-CM

## 2017-08-24 PROCEDURE — 20610 DRAIN/INJ JOINT/BURSA W/O US: CPT | Performed by: ORTHOPAEDIC SURGERY

## 2017-08-24 NOTE — PROGRESS NOTES
Subjective   Kelly Alvarez is a 72 y.o. female. 1945- SYNVISC ONE injection- Left knee       History of Present Illness   Patient is here for SYNVISC ONE injection to the left knee. Approval has been obtained from the patient's insurance. Patient is ready to proceed with the injection.     The following portions of the patient's history were reviewed and updated as appropriate:   She  has a past medical history of Diabetes mellitus; Diabetic foot ulcer; Hip pain; Hyperlipidemia; Hypertensive disorder; Hypertensive disorder; Knee pain; Osteoarthritis; and Shoulder pain.  She  does not have any pertinent problems on file.  She  has no past surgical history on file.  Her family history includes Diabetes in her other; Heart disease in her other; Hypertension in her other.  She  reports that she has quit smoking. She has never used smokeless tobacco. She reports that she does not drink alcohol or use illicit drugs.  Current Outpatient Prescriptions   Medication Sig Dispense Refill   • amitriptyline (ELAVIL) 50 MG tablet Take 50 mg by mouth every night.     • aspirin 81 MG chewable tablet Chew 81 mg daily.     • cloNIDine (CATAPRES) 0.3 MG tablet Take 0.3 mg by mouth 2 (two) times a day.     • COENZYME Q-10 PO Take  by mouth.     • furosemide (LASIX) 40 MG tablet Take 40 mg by mouth every morning.     • gabapentin (NEURONTIN) 300 MG capsule      • HYDROcodone-acetaminophen (NORCO)  MG per tablet Take 1 tablet by mouth 2 (two) times a day.     • levoFLOXacin (LEVAQUIN) 500 MG tablet      • lisinopril (PRINIVIL,ZESTRIL) 20 MG tablet Take 40 mg by mouth daily.     • metFORMIN (GLUCOPHAGE) 1000 MG tablet      • metFORMIN (GLUCOPHAGE) 500 MG tablet Take 500 mg by mouth 2 (two) times a day with meals.     • Naloxegol Oxalate (MOVANTIK) 25 MG tablet Take 1 tablet by mouth Daily. 30 tablet 1   • nystatin-triamcinolone (MYCOLOG II) 239467-5.1 UNIT/GM-% cream      • omeprazole (priLOSEC) 20 MG capsule      •  PEDIATRIC MULTIVITAMINS-FL PO Take  by mouth.       No current facility-administered medications for this visit.      Current Outpatient Prescriptions on File Prior to Visit   Medication Sig   • amitriptyline (ELAVIL) 50 MG tablet Take 50 mg by mouth every night.   • aspirin 81 MG chewable tablet Chew 81 mg daily.   • cloNIDine (CATAPRES) 0.3 MG tablet Take 0.3 mg by mouth 2 (two) times a day.   • COENZYME Q-10 PO Take  by mouth.   • furosemide (LASIX) 40 MG tablet Take 40 mg by mouth every morning.   • gabapentin (NEURONTIN) 300 MG capsule    • HYDROcodone-acetaminophen (NORCO)  MG per tablet Take 1 tablet by mouth 2 (two) times a day.   • levoFLOXacin (LEVAQUIN) 500 MG tablet    • lisinopril (PRINIVIL,ZESTRIL) 20 MG tablet Take 40 mg by mouth daily.   • metFORMIN (GLUCOPHAGE) 1000 MG tablet    • metFORMIN (GLUCOPHAGE) 500 MG tablet Take 500 mg by mouth 2 (two) times a day with meals.   • Naloxegol Oxalate (MOVANTIK) 25 MG tablet Take 1 tablet by mouth Daily.   • nystatin-triamcinolone (MYCOLOG II) 002527-6.1 UNIT/GM-% cream    • omeprazole (priLOSEC) 20 MG capsule    • PEDIATRIC MULTIVITAMINS-FL PO Take  by mouth.     No current facility-administered medications on file prior to visit.      She is allergic to augmentin [amoxicillin-pot clavulanate]; codeine; morphine and related; penicillins; and zanaflex [tizanidine hcl]..    Review of Systems  REVIEW OF SYSTEMS:  Negative, other than presenting complaint.  HEENT: No headaches, diplopia, blurred vision, tinnitus, vertigo, epistaxis, hoarseness or sore throat.  Pulmonary: No cough, sputum, hemoptysis, dyspnea, wheezing, or chest pain.  Cardiac: No chest pain, palpitations, orthopnea, paroxysmal nocturnal dyspnea, shortness of breath, or pedal edema.  Gastrointestinal: No diarrhea, melena, or constipation.  Genitourinary: No dysuria, hematuria, nocturia, frequency, bladder or bowel incontinence.  Hematology: No history of any anemia, fatigue, fever, or chills  "or night sweats.  Dermatology: No rashes, pruritus, or increased pigmentation changes of the skin.     Objective   Physical Exam  /74 (BP Location: Right arm, Patient Position: Sitting)  Ht 66\" (167.6 cm)  Wt 226 lb (103 kg)  BMI 36.48 kg/m2    Social History     Social History   • Marital status:      Spouse name: N/A   • Number of children: N/A   • Years of education: N/A     Occupational History   • Not on file.     Social History Main Topics   • Smoking status: Former Smoker   • Smokeless tobacco: Never Used      Comment: Quit about 17 yrs ago 5/13/16   • Alcohol use No   • Drug use: No   • Sexual activity: Defer     Other Topics Concern   • Not on file     Social History Narrative       HEENT: Normocephalic.  PERRLA.  TM's clear bilaterally.  Oropharynx: Clear.  Neck: Supple, with no adenopathy.  Chest: Equal bilateral expansion.  Clear to auscultation and percussion.  Heart: Regular sinus rhythm, S1 and S2 normal.  No murmurs or extra heart sounds heard.  Abdomen: Soft, nontender, and no organomegaly.  Neurological: cranial nerves II-XII normal Vascular: pulses are present  Dermatological: no rashes  or blemishes, or any abnormality of the skin.      Allergies verified. Area to the left knee was prepped in a sterile manner. Synvisc One was injected into the left knee without difficulty. Band aid was applied over the injection site. Patient was instructed to ice and elevate the left knee for the next 48 hrs. Patient will return in 3 months for recheck of the left knee.     Assessment/Plan   Problems Addressed this Visit        Musculoskeletal and Integument    Primary osteoarthritis of left knee    Relevant Medications    hylan (SYNVISC ONE) injection 48 mg (Completed) (Start on 8/24/2017 10:30 AM)       Other    Chronic pain of both knees - Primary                 "

## 2017-09-12 ENCOUNTER — OFFICE VISIT (OUTPATIENT)
Dept: PAIN MEDICINE | Facility: CLINIC | Age: 72
End: 2017-09-12

## 2017-09-12 VITALS
WEIGHT: 228.4 LBS | BODY MASS INDEX: 38.99 KG/M2 | HEIGHT: 64 IN | SYSTOLIC BLOOD PRESSURE: 132 MMHG | DIASTOLIC BLOOD PRESSURE: 82 MMHG

## 2017-09-12 DIAGNOSIS — G89.29 CHRONIC RIGHT SHOULDER PAIN: ICD-10-CM

## 2017-09-12 DIAGNOSIS — G89.29 CHRONIC PAIN OF LEFT KNEE: Primary | ICD-10-CM

## 2017-09-12 DIAGNOSIS — Z79.899 HIGH RISK MEDICATIONS (NOT ANTICOAGULANTS) LONG-TERM USE: ICD-10-CM

## 2017-09-12 DIAGNOSIS — E66.01 MORBID OBESITY DUE TO EXCESS CALORIES (HCC): ICD-10-CM

## 2017-09-12 DIAGNOSIS — M25.511 CHRONIC RIGHT SHOULDER PAIN: ICD-10-CM

## 2017-09-12 DIAGNOSIS — M25.562 CHRONIC PAIN OF LEFT KNEE: Primary | ICD-10-CM

## 2017-09-12 PROCEDURE — 99214 OFFICE O/P EST MOD 30 MIN: CPT | Performed by: NURSE PRACTITIONER

## 2017-09-12 RX ORDER — HYDROCODONE BITARTRATE AND ACETAMINOPHEN 10; 325 MG/1; MG/1
1 TABLET ORAL 4 TIMES DAILY
Qty: 120 TABLET | Refills: 0 | Status: SHIPPED | OUTPATIENT
Start: 2017-09-12 | End: 2017-10-12

## 2017-09-12 NOTE — PROGRESS NOTES
Kelly Alvarez is a 72 y.o. female.   1945    HPI:   Location: right shoulder, bilateral hip, left knee and right foot  Quality: aching, sharp and dull  Severity: 6/10  Timing: constant  Alleviating: pain medication  Aggravating: increased activity      Missed appt.  Has been stretching out her medication.  Patient denies side effects, she reports that her opioid medication still provides significant relief and allows her to be more active.      The following portions of the patient's history were reviewed by me and updated as appropriate: allergies, current medications, past family history, past medical history, past social history, past surgical history and problem list.    Past Medical History:   Diagnosis Date   • Diabetes mellitus    • Diabetic foot ulcer     Normal PRIYA      • Hip pain    • Hyperlipidemia    • Hypertensive disorder    • Hypertensive disorder    • Knee pain    • Osteoarthritis    • Shoulder pain        Social History     Social History   • Marital status:      Spouse name: N/A   • Number of children: N/A   • Years of education: N/A     Occupational History   • Not on file.     Social History Main Topics   • Smoking status: Former Smoker   • Smokeless tobacco: Never Used      Comment: Quit about 17 yrs ago 5/13/16   • Alcohol use No   • Drug use: No   • Sexual activity: Defer     Other Topics Concern   • Not on file     Social History Narrative       Family History   Problem Relation Age of Onset   • Diabetes Other    • Heart disease Other    • Hypertension Other          Current Outpatient Prescriptions:   •  amitriptyline (ELAVIL) 50 MG tablet, Take 50 mg by mouth every night., Disp: , Rfl:   •  aspirin 81 MG chewable tablet, Chew 81 mg daily., Disp: , Rfl:   •  cloNIDine (CATAPRES) 0.3 MG tablet, Take 0.3 mg by mouth 2 (two) times a day., Disp: , Rfl:   •  COENZYME Q-10 PO, Take  by mouth., Disp: , Rfl:   •  furosemide (LASIX) 40 MG tablet, Take 40 mg by mouth every morning.,  Disp: , Rfl:   •  gabapentin (NEURONTIN) 300 MG capsule, , Disp: , Rfl:   •  HYDROcodone-acetaminophen (NORCO)  MG per tablet, Take 1 tablet by mouth 2 (two) times a day., Disp: , Rfl:   •  levoFLOXacin (LEVAQUIN) 500 MG tablet, , Disp: , Rfl:   •  lisinopril (PRINIVIL,ZESTRIL) 20 MG tablet, Take 40 mg by mouth daily., Disp: , Rfl:   •  metFORMIN (GLUCOPHAGE) 1000 MG tablet, , Disp: , Rfl:   •  metFORMIN (GLUCOPHAGE) 500 MG tablet, Take 500 mg by mouth 2 (two) times a day with meals., Disp: , Rfl:   •  Naloxegol Oxalate (MOVANTIK) 25 MG tablet, Take 1 tablet by mouth Daily., Disp: 30 tablet, Rfl: 1  •  nystatin-triamcinolone (MYCOLOG II) 827457-2.1 UNIT/GM-% cream, , Disp: , Rfl:   •  omeprazole (priLOSEC) 20 MG capsule, , Disp: , Rfl:   •  PEDIATRIC MULTIVITAMINS-FL PO, Take  by mouth., Disp: , Rfl:   •  HYDROcodone-acetaminophen (NORCO)  MG per tablet, Take 1 tablet by mouth 4 (Four) Times a Day for 30 days., Disp: 120 tablet, Rfl: 0  •  HYDROcodone-acetaminophen (NORCO)  MG per tablet, Take 1 tablet by mouth 4 (Four) Times a Day for 30 days., Disp: 120 tablet, Rfl: 0    Allergies   Allergen Reactions   • Codeine    • Morphine And Related    • Penicillins    • Zanaflex [Tizanidine Hcl]        Review of Systems   Musculoskeletal:        Right shoulder pain  Bilateral hip pain  Left knee pain  Right foot pain   All other systems reviewed and are negative.    All systems reviewed and negative except for above.    Physical Exam   Constitutional: She appears well-developed and well-nourished. No distress.   Musculoskeletal:        Right shoulder: She exhibits decreased range of motion (decr arom right shoulder with pain, abduction only about 20deg and flexion to just less than 90 deg. ).   Painful arom l knee.   Neurological: She is alert. Gait (cane for ambulation) abnormal.   Cane for ambulation   Psychiatric: She has a normal mood and affect. Her behavior is normal. Judgment normal.       Kelly  was seen today for shoulder pain, hip pain, knee pain and foot pain.    Diagnoses and all orders for this visit:    Chronic pain of left knee    Chronic right shoulder pain    High risk medications (not anticoagulants) long-term use    Morbid obesity due to excess calories    Other orders  -     HYDROcodone-acetaminophen (NORCO)  MG per tablet; Take 1 tablet by mouth 4 (Four) Times a Day for 30 days.  -     HYDROcodone-acetaminophen (NORCO)  MG per tablet; Take 1 tablet by mouth 4 (Four) Times a Day for 30 days.        Medication: Patient reports no negative side effects, Patient reports appropriate usage and storage habits, Patient's opioid provides enough reflief to be more active and perform activities of daily living with less discomfort. and Refill opioid medication as above.      Interventional: none at this time    Rehab: none at this time    Behavioral: No aberrant behavior noted. [facility] Report #[id number]  was reviewed and is consistent with stated history    Urine drug screen Reviewed from last visit and is appropriate          This document has been electronically signed by Demar Napier MD on September 12, 2017 2:23 PM

## 2017-11-08 ENCOUNTER — OFFICE VISIT (OUTPATIENT)
Dept: PAIN MEDICINE | Facility: CLINIC | Age: 72
End: 2017-11-08

## 2017-11-08 VITALS
BODY MASS INDEX: 40.2 KG/M2 | HEIGHT: 64 IN | WEIGHT: 235.5 LBS | DIASTOLIC BLOOD PRESSURE: 82 MMHG | SYSTOLIC BLOOD PRESSURE: 130 MMHG

## 2017-11-08 DIAGNOSIS — M25.511 CHRONIC RIGHT SHOULDER PAIN: ICD-10-CM

## 2017-11-08 DIAGNOSIS — G89.29 CHRONIC PAIN OF LEFT KNEE: Primary | ICD-10-CM

## 2017-11-08 DIAGNOSIS — G89.29 CHRONIC RIGHT SHOULDER PAIN: ICD-10-CM

## 2017-11-08 DIAGNOSIS — M25.562 CHRONIC PAIN OF LEFT KNEE: Primary | ICD-10-CM

## 2017-11-08 DIAGNOSIS — Z79.899 HIGH RISK MEDICATIONS (NOT ANTICOAGULANTS) LONG-TERM USE: ICD-10-CM

## 2017-11-08 PROCEDURE — 99213 OFFICE O/P EST LOW 20 MIN: CPT | Performed by: PAIN MEDICINE

## 2017-11-08 RX ORDER — HYDROCODONE BITARTRATE AND ACETAMINOPHEN 10; 325 MG/1; MG/1
1 TABLET ORAL 4 TIMES DAILY
Qty: 120 TABLET | Refills: 0 | Status: SHIPPED | OUTPATIENT
Start: 2017-11-08 | End: 2017-12-08

## 2017-11-08 NOTE — PROGRESS NOTES
Kelly Alvarez is a 72 y.o. female.   1945    HPI:   Location: right shoulder, bilateral hip, left knee and right foot  Quality: aching, sharp and dull  Severity: 6/10  Timing: constant  Alleviating: pain medication  Aggravating: increased activity    Patient reports that the opioid medication still provides her good relief and allows more activity than she would have without the opioid medication.  She denies side effects.        The following portions of the patient's history were reviewed by me and updated as appropriate: allergies, current medications, past family history, past medical history, past social history, past surgical history and problem list.    Past Medical History:   Diagnosis Date   • Diabetes mellitus    • Diabetic foot ulcer     Normal PRIYA      • Hip pain    • Hyperlipidemia    • Hypertensive disorder    • Hypertensive disorder    • Knee pain    • Osteoarthritis    • Shoulder pain        Social History     Social History   • Marital status:      Spouse name: N/A   • Number of children: N/A   • Years of education: N/A     Occupational History   • Not on file.     Social History Main Topics   • Smoking status: Former Smoker   • Smokeless tobacco: Never Used      Comment: Quit about 17 yrs ago 5/13/16   • Alcohol use No   • Drug use: No   • Sexual activity: Defer     Other Topics Concern   • Not on file     Social History Narrative       Family History   Problem Relation Age of Onset   • Diabetes Other    • Heart disease Other    • Hypertension Other          Current Outpatient Prescriptions:   •  amitriptyline (ELAVIL) 50 MG tablet, Take 50 mg by mouth every night., Disp: , Rfl:   •  aspirin 81 MG chewable tablet, Chew 81 mg daily., Disp: , Rfl:   •  cloNIDine (CATAPRES) 0.3 MG tablet, Take 0.3 mg by mouth 2 (two) times a day., Disp: , Rfl:   •  COENZYME Q-10 PO, Take  by mouth., Disp: , Rfl:   •  furosemide (LASIX) 40 MG tablet, Take 40 mg by mouth every morning., Disp: , Rfl:   •   gabapentin (NEURONTIN) 300 MG capsule, , Disp: , Rfl:   •  HYDROcodone-acetaminophen (NORCO)  MG per tablet, Take 1 tablet by mouth 2 (two) times a day., Disp: , Rfl:   •  levoFLOXacin (LEVAQUIN) 500 MG tablet, , Disp: , Rfl:   •  lisinopril (PRINIVIL,ZESTRIL) 20 MG tablet, Take 40 mg by mouth daily., Disp: , Rfl:   •  metFORMIN (GLUCOPHAGE) 1000 MG tablet, , Disp: , Rfl:   •  metFORMIN (GLUCOPHAGE) 500 MG tablet, Take 500 mg by mouth 2 (two) times a day with meals., Disp: , Rfl:   •  Naloxegol Oxalate (MOVANTIK) 25 MG tablet, Take 1 tablet by mouth Daily., Disp: 30 tablet, Rfl: 1  •  nystatin-triamcinolone (MYCOLOG II) 522927-9.1 UNIT/GM-% cream, , Disp: , Rfl:   •  omeprazole (priLOSEC) 20 MG capsule, , Disp: , Rfl:   •  PEDIATRIC MULTIVITAMINS-FL PO, Take  by mouth., Disp: , Rfl:   •  HYDROcodone-acetaminophen (NORCO)  MG per tablet, Take 1 tablet by mouth 4 (Four) Times a Day for 30 days., Disp: 120 tablet, Rfl: 0  •  HYDROcodone-acetaminophen (NORCO)  MG per tablet, Take 1 tablet by mouth 4 (Four) Times a Day for 30 days., Disp: 120 tablet, Rfl: 0    Allergies   Allergen Reactions   • Codeine    • Morphine And Related    • Penicillins    • Zanaflex [Tizanidine Hcl]        Review of Systems   Musculoskeletal:        Right shoulder pain bilateral hip pain left knee pain right foot pain     All other systems reviewed and are negative.    All systems reviewed and negative except for above.    Physical Exam   Constitutional: She appears well-developed and well-nourished. No distress.   Musculoskeletal:        Right shoulder: She exhibits decreased range of motion (decr arom right shoulder with pain, abduction only about 20deg and flexion to just less than 70 deg. ).   Painful arom l knee.   Neurological: She is alert. Gait (cane for ambulation) abnormal.   Cane for ambulation   Psychiatric: She has a normal mood and affect. Her behavior is normal. Judgment normal.       Kelly was seen today for  shoulder pain, hip pain, knee pain and foot pain.    Diagnoses and all orders for this visit:    Chronic pain of left knee    Chronic right shoulder pain    High risk medications (not anticoagulants) long-term use    Other orders  -     HYDROcodone-acetaminophen (NORCO)  MG per tablet; Take 1 tablet by mouth 4 (Four) Times a Day for 30 days.  -     HYDROcodone-acetaminophen (NORCO)  MG per tablet; Take 1 tablet by mouth 4 (Four) Times a Day for 30 days.        Medication: Patient reports no negative side effects, Patient reports appropriate usage and storage habits, Patient's opioid provides enough relief to be more active and perform activities of daily living with less discomfort. and Refill opioid medication as above.    Interventional: none at this time.    Rehab: none at this time    Behavioral: No aberrant behavior noted. [facility] Report #[id number]  was reviewed and is consistent with stated history.    Urine drug screen None at this time          This document has been electronically signed by Demar Napier MD on November 8, 2017 2:12 PM

## 2018-01-02 ENCOUNTER — OFFICE VISIT (OUTPATIENT)
Dept: PAIN MEDICINE | Facility: CLINIC | Age: 73
End: 2018-01-02

## 2018-01-02 VITALS
HEIGHT: 64 IN | WEIGHT: 233.3 LBS | SYSTOLIC BLOOD PRESSURE: 130 MMHG | BODY MASS INDEX: 39.83 KG/M2 | DIASTOLIC BLOOD PRESSURE: 80 MMHG

## 2018-01-02 DIAGNOSIS — M17.12 PRIMARY OSTEOARTHRITIS OF LEFT KNEE: ICD-10-CM

## 2018-01-02 DIAGNOSIS — M25.562 CHRONIC PAIN OF BOTH KNEES: Primary | ICD-10-CM

## 2018-01-02 DIAGNOSIS — M25.561 CHRONIC PAIN OF BOTH KNEES: Primary | ICD-10-CM

## 2018-01-02 DIAGNOSIS — G89.29 CHRONIC PAIN OF BOTH KNEES: Primary | ICD-10-CM

## 2018-01-02 DIAGNOSIS — M25.511 RIGHT SHOULDER PAIN, UNSPECIFIED CHRONICITY: ICD-10-CM

## 2018-01-02 DIAGNOSIS — M79.18 MYOFACIAL MUSCLE PAIN: ICD-10-CM

## 2018-01-02 DIAGNOSIS — Z74.09 IMPAIRED MOBILITY: ICD-10-CM

## 2018-01-02 PROCEDURE — 99213 OFFICE O/P EST LOW 20 MIN: CPT | Performed by: PAIN MEDICINE

## 2018-01-02 RX ORDER — HYDROCODONE BITARTRATE AND ACETAMINOPHEN 10; 325 MG/1; MG/1
1 TABLET ORAL 4 TIMES DAILY
Qty: 120 TABLET | Refills: 0 | Status: SHIPPED | OUTPATIENT
Start: 2018-01-02

## 2018-01-02 NOTE — PROGRESS NOTES
Kelly Alvarez is a 72 y.o. female.   1945    HPI:   Location: right shoulder, bilateral hip, left knee and right foot  Quality: aching, sharp and dull  Severity: 6/10  Timing: constant  Alleviating: pain medication  Aggravating: increased activity    Pt with chronic right shoulder and bilateral knee pain. Pt uses cane for ambulation. Opiate medications provides enough relief for daily activity and ambulation. NO SE. Pt happy with pain management visit and regimen.         The following portions of the patient's history were reviewed by me and updated as appropriate: allergies, current medications, past family history, past medical history, past social history, past surgical history and problem list.    Past Medical History:   Diagnosis Date   • Diabetes mellitus    • Diabetic foot ulcer     Normal PRIYA      • Hip pain    • Hyperlipidemia    • Hypertensive disorder    • Hypertensive disorder    • Knee pain    • Osteoarthritis    • Shoulder pain        Social History     Social History   • Marital status:      Spouse name: N/A   • Number of children: N/A   • Years of education: N/A     Occupational History   • Not on file.     Social History Main Topics   • Smoking status: Former Smoker   • Smokeless tobacco: Never Used      Comment: Quit about 17 yrs ago 5/13/16   • Alcohol use No   • Drug use: No   • Sexual activity: Defer     Other Topics Concern   • Not on file     Social History Narrative       Family History   Problem Relation Age of Onset   • Diabetes Other    • Heart disease Other    • Hypertension Other          Current Outpatient Prescriptions:   •  amitriptyline (ELAVIL) 50 MG tablet, Take 50 mg by mouth every night., Disp: , Rfl:   •  aspirin 81 MG chewable tablet, Chew 81 mg daily., Disp: , Rfl:   •  cloNIDine (CATAPRES) 0.3 MG tablet, Take 0.3 mg by mouth 2 (two) times a day., Disp: , Rfl:   •  COENZYME Q-10 PO, Take  by mouth., Disp: , Rfl:   •  furosemide (LASIX) 40 MG tablet, Take 40 mg  by mouth every morning., Disp: , Rfl:   •  gabapentin (NEURONTIN) 300 MG capsule, , Disp: , Rfl:   •  HYDROcodone-acetaminophen (NORCO)  MG per tablet, Take 1 tablet by mouth 4 (Four) Times a Day., Disp: 120 tablet, Rfl: 0  •  levoFLOXacin (LEVAQUIN) 500 MG tablet, , Disp: , Rfl:   •  lisinopril (PRINIVIL,ZESTRIL) 20 MG tablet, Take 40 mg by mouth daily., Disp: , Rfl:   •  metFORMIN (GLUCOPHAGE) 1000 MG tablet, , Disp: , Rfl:   •  metFORMIN (GLUCOPHAGE) 500 MG tablet, Take 500 mg by mouth 2 (two) times a day with meals., Disp: , Rfl:   •  Naloxegol Oxalate (MOVANTIK) 25 MG tablet, Take 1 tablet by mouth Daily., Disp: 30 tablet, Rfl: 1  •  nystatin-triamcinolone (MYCOLOG II) 172182-6.1 UNIT/GM-% cream, , Disp: , Rfl:   •  omeprazole (priLOSEC) 20 MG capsule, , Disp: , Rfl:   •  PEDIATRIC MULTIVITAMINS-FL PO, Take  by mouth., Disp: , Rfl:     Allergies   Allergen Reactions   • Codeine    • Morphine And Related    • Penicillins    • Zanaflex [Tizanidine Hcl]        Review of Systems   Musculoskeletal:        Right shoulder pain  Bilateral hip pain left knee pain right foot pain     All other systems reviewed and are negative.    All systems reviewed and negative except for above.    Physical Exam   Constitutional: She is oriented to person, place, and time. She appears well-developed and well-nourished. No distress.   Cardiovascular: Normal rate.    Pulmonary/Chest: Effort normal. No respiratory distress.   Musculoskeletal:        Right shoulder: She exhibits tenderness.        Right knee: She exhibits normal range of motion. Tenderness found.        Left knee: She exhibits normal range of motion. Tenderness ( worse with ambulation) found.   Painful arom montrell knees.   Neurological: She is alert and oriented to person, place, and time. She displays no tremor. She displays no seizure activity. Gait (CANE) abnormal. Coordination normal.   Skin: Skin is warm and dry.   Psychiatric: She has a normal mood and affect.  "Her behavior is normal. Judgment normal. She expresses no homicidal and no suicidal ideation.   Nursing note and vitals reviewed.      Kelly was seen today for shoulder pain, hip pain, knee pain and foot pain.    Diagnoses and all orders for this visit:    Chronic pain of both knees  -     HYDROcodone-acetaminophen (NORCO)  MG per tablet; Take 1 tablet by mouth 4 (Four) Times a Day.    Primary osteoarthritis of left knee  -     HYDROcodone-acetaminophen (NORCO)  MG per tablet; Take 1 tablet by mouth 4 (Four) Times a Day.    Impaired mobility    Myofacial muscle pain  -     HYDROcodone-acetaminophen (NORCO)  MG per tablet; Take 1 tablet by mouth 4 (Four) Times a Day.    Right shoulder pain, unspecified chronicity  -     HYDROcodone-acetaminophen (NORCO)  MG per tablet; Take 1 tablet by mouth 4 (Four) Times a Day.        Medication: Patient reports no negative side effects, Patient reports appropriate usage and storage habits and Patient's opioid provides enough relief to be more active and perform activities of daily living with less discomfort. Norco 10mg qid. Discussed case with Dariel Napier, opiate medication refilled ×2 hand written scripts.      Interventional: Pt states \"I dont want any more surgeries on my knees.\" Hx of OA. Pt will follow Dr. Napier to new venue.     Rehab: Cane for mobility.     Behavioral: No aberrant behavior noted. [facility] Report # [id number]  was reviewed and is consistent with stated history    Urine drug screen None at this time          This document has been electronically signed by MARGARITO Yin on January 2, 2018 11:43 AM          This document has been electronically signed by MARGARITO Yin on January 2, 2018 11:43 AM     "

## 2018-09-05 ENCOUNTER — TRANSCRIBE ORDERS (OUTPATIENT)
Dept: PHYSICAL THERAPY | Facility: HOSPITAL | Age: 73
End: 2018-09-05

## 2018-09-05 DIAGNOSIS — M25.511 CHRONIC RIGHT SHOULDER PAIN: Primary | ICD-10-CM

## 2018-09-05 DIAGNOSIS — G89.29 CHRONIC RIGHT SHOULDER PAIN: Primary | ICD-10-CM

## 2018-09-13 ENCOUNTER — HOSPITAL ENCOUNTER (OUTPATIENT)
Dept: PHYSICAL THERAPY | Facility: HOSPITAL | Age: 73
Setting detail: THERAPIES SERIES
Discharge: HOME | End: 2018-09-13

## 2018-09-13 DIAGNOSIS — M25.511 RIGHT SHOULDER PAIN, UNSPECIFIED CHRONICITY: Primary | ICD-10-CM

## 2018-09-13 PROCEDURE — G8984 CARRY CURRENT STATUS: HCPCS | Performed by: PHYSICAL THERAPIST

## 2018-09-13 PROCEDURE — 97162 PT EVAL MOD COMPLEX 30 MIN: CPT | Performed by: PHYSICAL THERAPIST

## 2018-09-13 PROCEDURE — G8985 CARRY GOAL STATUS: HCPCS | Performed by: PHYSICAL THERAPIST

## 2018-09-13 PROCEDURE — 97110 THERAPEUTIC EXERCISES: CPT | Performed by: PHYSICAL THERAPIST

## 2018-09-13 NOTE — THERAPY EVALUATION
Outpatient Physical Therapy Ortho Initial Evaluation  [facility]  Danielle Hendrickson, PT, DPT, CSCS       Patient Name: Kelly Alvarez  : 1945  MRN: [id number]  Today's Date: 2018      Visit Date: 2018     Pt reports 1/10 pain pre treatment, 3/10 pain post treatment  Reports N/A% of improvement.  Attended  visits.  Insurance available: medicare guidelines  Next MD appt: [id number].  Recertification: 10/04/2018.    Patient Active Problem List   Diagnosis   • Osteoarthritis of knee   • Status post total right knee replacement   • Chronic pain of both knees   • Right shoulder pain   • Pain of both hip joints   • High risk medications (not anticoagulants) long-term use   • Primary osteoarthritis of left knee        Past Medical History:   Diagnosis Date   • Diabetes mellitus (CMS/HCC)    • Diabetic foot ulcer (CMS/HCC)     Normal PRIYA      • Hip pain    • Hyperlipidemia    • Hypertensive disorder    • Hypertensive disorder    • Knee pain    • Osteoarthritis    • Shoulder pain         History reviewed. No pertinent surgical history.    Visit Dx:     ICD-10-CM ICD-9-CM   1. Right shoulder pain, unspecified chronicity M25.511 719.41     Number of days off work: N/A    Patient is .    Patient has grown children.    Allergies       CodeineOther (See Comments)   Zanaflex [Tizanidine Hcl]Other (See Comments)     Diego as Reviewed Reviewed by You at 2:31 PM     Medications (Admitted on 2018)     amitriptyline (ELAVIL) 50 MG tablet     aspirin 81 MG chewable tablet     cloNIDine (CATAPRES) 0.3 MG tablet     COENZYME Q-10 PO     furosemide (LASIX) 40 MG tablet     gabapentin (NEURONTIN) 300 MG capsule     HYDROcodone-acetaminophen (NORCO)  MG per tablet     levoFLOXacin (LEVAQUIN) 500 MG tablet     lisinopril (PRINIVIL,ZESTRIL) 20 MG tablet     metFORMIN (GLUCOPHAGE) 1000 MG tablet     metFORMIN (GLUCOPHAGE) 500 MG tablet     Naloxegol Oxalate (MOVANTIK) 25 MG  tablet     nystatin-triamcinolone (MYCOLOG II) 748961-7.1 UNIT/GM-% cream     omeprazole (priLOSEC) 20 MG capsule     PEDIATRIC MULTIVITAMINS-FL PO                Patient History     Row Name 09/13/18 1400             History    Chief Complaint Pain  -AJ      Type of Pain Shoulder pain  -AJ      Date Current Problem(s) Began --   4-5 years ago  -AJ      Brief Description of Current Complaint Patient report she fell 4-5 years ago when up at [facility] at the wound center. patient reports she had an MRI and took them to Dr. Fox and he reviewed them.   -AJ      Previous treatment for THIS PROBLEM Injections;Medication;Pain Management  -AJ      Patient/Caregiver Goals Relieve pain;Improve mobility;Improve strength  -AJ      Current Tobacco Use None  -AJ      Smoking Status Former smoker  -AJ      Patient's Rating of General Health Fair  -AJ      Hand Dominance right-handed  -AJ      Occupation/sports/leisure activities Occupation: retired; Hobbies: needlework, painting, puzzle books, games  -AJ      Patient seeing anyone else for problem(s)? Yes, pain management  -AJ      What clinical tests have you had for this problem? X-ray;MRI  -AJ      Results of Clinical Tests atrophy in the shoulder and arthritis.  -AJ      History of Previous Related Injuries None other than the fall listed above.  -AJ      Are you or can you be pregnant No  -AJ         Pain     Pain Location Shoulder  -AJ      Pain at Present 1  -AJ      Pain at Best 1  -AJ      Pain at Worst 10  -AJ      Pain Frequency Constant/continuous  -AJ      Pain Description Tightness  -AJ      What Performance Factors Make the Current Problem(s) WORSE? using it a lot  -AJ      What Performance Factors Make the Current Problem(s) BETTER? pain medication  -AJ      Is your sleep disturbed? Yes   Sometimes  -AJ      Is medication used to assist with sleep? Yes  -AJ      What position do you sleep in? Left sidelying;Supine  -AJ      Difficulties at work? N/A  -AJ       Difficulties with ADL's? dressing, grooming, bathing  -AJ      Difficulties with recreational activities? painting  -AJ         Fall Risk Assessment    Any falls in the past year: No  -AJ      Number of falls reported in the last 12 months 0  -AJ      Does patient have a fear of falling Yes (comment)   doesn't want to get hurt.  -AJ        User Key  (r) = Recorded By, (t) = Taken By, (c) = Cosigned By    Initials Name Provider Type    AJ Danielle Hendrickson, PT Physical Therapist                PT Ortho     Row Name 09/13/18 1400       Subjective Comments    Subjective Comments Patient wishes to get the shoulder working again.  -AJ       Precautions and Contraindications    Precautions/Limitations no known precautions/limitations  -AJ       Subjective Pain    Able to rate subjective pain? yes  -AJ    Pre-Treatment Pain Level 1  -AJ    Post-Treatment Pain Level 3  -AJ       Posture/Observations    Alignment Options Forward head;Cervical lordosis;Thoracic kyphosis;Rounded shoulders;Scapular elevation  -AJ    Forward Head Mild;Increased  -AJ    Cervical Lordosis Mild;Increased  -AJ    Thoracic Kyphosis Mild;Moderate;Increased  -AJ    Rounded Shoulders Bilateral:;Mild;Moderate;Increased  -AJ    Scapular Elevation Bilateral:;Normal  -AJ    Posture/Observations Comments No acute distress, forward head/shoulder posture  -AJ       Special Tests/Palpation    Special Tests/Palpation --   Mild TTP distal shoulder, non-apecific  -AJ       Shoulder Impingement/Rotator Cuff Special Tests    Fuller-Jose Luis Test (RC Lesion vs. Bursitis) Right:;Positive;Left:;Negative  -    Neer Impingement Test (RC Lesion vs. Bursitis) Right:;Positive;Left:;Negative  -    Drop Arm Test (Full Thickness RC Lesion) Right:;Positive  -       Shoulder Laxity/Instability Special Tests    Load and Shift Test Bilateral:;Negative  -    Sulcus Sign, 0 Degrees Bilateral:;Negative  -    Anterior Apprehension/Relocation Test, at 90 Degrees  Bilateral:;Negative  -AJ       General ROM    RT Upper Ext Rt Shoulder ABduction;Rt Shoulder Flexion;Rt Shoulder External Rotation;Rt Shoulder Internal Rotation  -AJ    LT Upper Ext Lt Shoulder ABduction;Lt Shoulder Flexion;Lt Shoulder External Rotation;Lt Shoulder Internal Rotation  -AJ       Right Upper Ext    Rt Shoulder Abduction AROM 75°  -AJ    Rt Shoulder Abduction PROM ~100°  -AJ    Rt Shoulder Flexion AROM 82°  -AJ    Rt Shoulder Flexion PROM ~120°  -AJ    Rt Shoulder External Rotation AROM occiput  -AJ    Rt Shoulder Internal Rotation AROM L5  -AJ       Left Upper Ext    Lt Shoulder Abduction AROM 165°  -AJ    Lt Shoulder Flexion AROM 140°  -AJ    Lt Shoulder External Rotation AROM T1  -AJ    Lt Shoulder Internal Rotation AROM T12  -AJ       MMT (Manual Muscle Testing)    General MMT Comments L UE 4+/5; R UE 32/5  -AJ       Sensation    Sensation WNL? WNL  -AJ    Light Touch No apparent deficits  -AJ    Additional Comments Denies any numbness or tingling in B UEs  -AJ       Pathomechanics    Upper Extremity Pathomechanics Excessive scapular elevation   R UE  -AJ       Transfers    Comment (Transfers) I with all transfers.  -AJ       Gait/Stairs Assessment/Training    Comment (Gait/Stairs) FWB, non-antalgic gait, no acute distress, normal arm swing with gait.  -AJ      User Key  (r) = Recorded By, (t) = Taken By, (c) = Cosigned By    Initials Name Provider Type    Danielle Vilchis, PT Physical Therapist            Therapy Education  Given: HEP, Symptoms/condition management, Pain management, Posture/body mechanics, Mobility training (POC)  Program: New  How Provided: Verbal, Demonstration, Written  Provided to: Patient  Level of Understanding: Verbalized, Demonstrated           PT OP Goals     Row Name 09/13/18 1400          PT Short Term Goals    STG Date to Achieve 10/04/18  -     STG 1 I with all HEP and have additions/changes by next recertification.  -AJ     STG 2 AROm R shoulder flexion >=  90°  -     STG 3 AROm R shoulder abduction >= 90°  -     STG 4 AROM R shoulder ER >= C3  -     STG 5 AROm R shoulder IR >= L2  -        Long Term Goals    LTG Date to Achieve 10/19/18  -     LTG 1 AROm R shoulder flexion >= 120°  -     LTG 2 AROm R shoulder abduction >= 110°  -     LTG 3 R shoulder 3-/5 or greater.  -     LTG 4 Patient to be more aware of posture and posture correctoin technique.  -     LTG 5 I with final HEP.  -     LTG 6 D/C with a final HEP and free 30 day fitness formula memebership.  -        Time Calculation    PT Goal Re-Cert Due Date 10/04/18  -       User Key  (r) = Recorded By, (t) = Taken By, (c) = Cosigned By    Initials Name Provider Type    Danielle Vilchis, PT Physical Therapist         Barriers to Rehab:Include significant or possible arthritic/degenerative changes that have occurred within the joint, The patient's obesity, The patient's generally deconditioned state.    Safety Issues: None noted.          PT Assessment/Plan     Row Name 09/13/18 1400          PT Assessment    Functional Limitations Limitation in home management;Limitations in community activities;Performance in leisure activities;Performance in self-care ADL  -     Impairments Endurance;Impaired flexibility;Impaired muscle length;Impaired muscle power;Joint integrity;Joint mobility;Muscle strength;Pain;Posture;Range of motion  -     Assessment Comments Patient did wellwith all ther ex and given written copy of HEP exercises.  -     Rehab Potential Good  -     Patient/caregiver participated in establishment of treatment plan and goals Yes  -     Patient would benefit from skilled therapy intervention Yes  -        PT Plan    PT Frequency 2x/week  -     Predicted Duration of Therapy Intervention (Therapy Eval) 3-6 weeks, 6-12 visits  -     Planned CPT's? PT EVAL MOD COMPLELITY: 91648;PT RE-EVAL: 66724;PT THER PROC EA 15 MIN: 50979;PT THER ACT EA 15 MIN: 93256;PT MANUAL  "THERAPY EA 15 MIN: 62263;PT ELECTRICAL STIM UNATTEND: ;PT THER SUPP EA 15 MIN  -     Physical Therapy Interventions (Optional Details) gross motor skills;home exercise program;joint mobilization;manual therapy techniques;modalities;patient/family education;postural re-education;ROM (Range of Motion);strengthening;stretching  -     PT Plan Comments Progres ROm and scap stab.  -       User Key  (r) = Recorded By, (t) = Taken By, (c) = Cosigned By    Initials Name Provider Type    Danielle Vilchis, PT Physical Therapist       Other therapeutic activities and/or exercises will be prescribed depending on the patients progress or lack there of.          Modalities     Row Name 09/13/18 1400             Moist Heat    MH Applied Yes  -      Location R shoulder- sitting in chair.  -AJ      Rx Minutes 15 mins  -      MH S/P Rx Yes  -        User Key  (r) = Recorded By, (t) = Taken By, (c) = Cosigned By    Initials Name Provider Type    Danielle Vilchis, AFSANEH Physical Therapist              Exercises     Row Name 09/13/18 1400             Subjective Comments    Subjective Comments Patient wishes to get the shoulder working again.  -         Subjective Pain    Able to rate subjective pain? yes  -AJ      Pre-Treatment Pain Level 1  -      Post-Treatment Pain Level 3  -         Exercise 1    Exercise Name 1 Pulley's 3-way  -      Time 1 3 min each  -         Exercise 2    Exercise Name 2 Scap sqeezes  -AJ      Reps 2 5\" hold  -         Exercise 3    Exercise Name 3 Supine wand IR/ER  -      Reps 3 20 each  -        User Key  (r) = Recorded By, (t) = Taken By, (c) = Cosigned By    Initials Name Provider Type    Danielle Vilchis, PT Physical Therapist                        Outcome Measure Options: Quick DASH  Quick DASH  Open a tight or new jar.: Severe Difficulty  Do heavy household chores (e.g., wash walls, wash floors): Severe Difficulty  Carry a shopping bag or briefcase: " Mild Difficulty  Wash your back: Mild Difficulty  Recreational activities in which you take some force or impact through your arm, should or hand (e.g. golf, hammering, tennis, etc.): Severe Difficulty  During the past week, to what extent has your arm, shoulder, or hand problem interfered with your normal social activites with family, friends, neighbors or groups?: Quite a bit  During the past week, were you limited in your work or other regular daily activities as a result of your arm, shoulder or hand problem?: Very limited  Arm, Shoulder, or hand pain: Extreme  Tingling (pins and needles) in your arm, shoulder, or hand: Extreme  During the past week, how much difficulty have you had sleeping because of the pain in your arm, shoulder or hand?: Severe Difficulty  Number of Questions Answered: 10  Quick DASH Score: 70         Time Calculation:   Start Time: 1422  Stop Time: 1523  Time Calculation (min): 61 min  PT Non-Billable Time (min): 15 min  Total Timed Code Minutes- PT: 23 minute(s)     Therapy Charges for Today     Code Description Service Date Service Provider Modifiers Qty    82714255399 HC PT CARRY MOV HAND OBJ CURRENT 9/13/2018 Danielle Hendrickson, PT GP, CL 1    18290752357 HC PT CARRY MOV HAND OBJ PROJECTED 9/13/2018 Danielle Hendrickson, PT GP, CK 1    80865082877 HC PT EVAL MOD COMPLEXITY 2 9/13/2018 Danielle Hendrickson, PT GP 1    69427066400 HC PT THER PROC EA 15 MIN 9/13/2018 Danielle Hendrickson, PT GP 2    42406248978 HC PT THER SUPP EA 15 MIN 9/13/2018 Danielle Hendrickson, PT GP 1     NE EXERCISE EQUIPMENT 9/13/2018 Danielle Hendrickson, PT  1          PT G-Codes  Outcome Measure Options: Quick DASH  Quick DASH Score: 70  Carrying, Moving and Handling Objects Current Status (): At least 60 percent but less than 80 percent impaired, limited or restricted  Carrying, Moving and Handling Objects Goal Status (): At least 40 percent but less than 60 percent impaired, limited  or restricted         Danielle Hendrickson, PT, DPT, CSCS  9/13/2018

## 2018-09-17 ENCOUNTER — HOSPITAL ENCOUNTER (OUTPATIENT)
Dept: PHYSICAL THERAPY | Facility: HOSPITAL | Age: 73
Setting detail: THERAPIES SERIES
Discharge: HOME | End: 2018-09-17

## 2018-09-17 DIAGNOSIS — M25.511 RIGHT SHOULDER PAIN, UNSPECIFIED CHRONICITY: Primary | ICD-10-CM

## 2018-09-17 PROCEDURE — 97110 THERAPEUTIC EXERCISES: CPT

## 2018-09-17 NOTE — THERAPY TREATMENT NOTE
Outpatient Physical Therapy Ortho Treatment Note  [facility]  Marnie Deal PTA       Patient Name: Kelly Alvarez  : 1945  MRN: [id number]  Today's Date: 2018      Visit Date: 2018     Visits: 2/2  Insurance Visits Approved: based on medicare guidelines  Recert Due: 10/04/2018  MD Appt: 2018  Pain: pretreatment 3/10; post treatment 7 with movement/10  Improvement: pt is subjectively reporting 0% improvement since initial evaluation    Visit Dx:    ICD-10-CM ICD-9-CM   1. Right shoulder pain, unspecified chronicity M25.511 719.41       Patient Active Problem List   Diagnosis   • Osteoarthritis of knee   • Status post total right knee replacement   • Chronic pain of both knees   • Right shoulder pain   • Pain of both hip joints   • High risk medications (not anticoagulants) long-term use   • Primary osteoarthritis of left knee        Past Medical History:   Diagnosis Date   • Diabetes mellitus (CMS/HCC)    • Diabetic foot ulcer (CMS/[facility], located [facility])     Normal PRIYA      • Hip pain    • Hyperlipidemia    • Hypertensive disorder    • Hypertensive disorder    • Knee pain    • Osteoarthritis    • Shoulder pain         No past surgical history on file.          PT Ortho     Row Name 18 1300       Subjective Comments    Subjective Comments states that last treatment has her arthritis acting up.   -       Precautions and Contraindications    Precautions/Limitations no known precautions/limitations  -       Subjective Pain    Able to rate subjective pain? yes  -    Pre-Treatment Pain Level 3  -    Post-Treatment Pain Level --   0 with no movement; 7 with movement  -      User Key  (r) = Recorded By, (t) = Taken By, (c) = Cosigned By    Initials Name Provider Type     Marnie Deal PTA Physical Therapy Assistant                            PT Assessment/Plan     Row Name 18 1300          PT Assessment    Assessment Comments patient has increased pain with  movement. becomes sick near end of treatment and patient relates that incident to her poor eating habits and having only had crackers and cheese even though its now almost 1500 in the afternoon.   -        PT Plan    PT Frequency 2x/week  -     PT Plan Comments continue to progress scap strengtheing and ROM as able  -       User Key  (r) = Recorded By, (t) = Taken By, (c) = Cosigned By    Initials Name Provider Type     Marnie Deal PTA Physical Therapy Assistant                Modalities     Row Name 09/17/18 1300             Ice    Ice Applied Yes  -      Location right shoulder sitting in chair  -      Rx Minutes 15 mins  -      Ice S/P Rx Yes  -        User Key  (r) = Recorded By, (t) = Taken By, (c) = Cosigned By    Initials Name Provider Type     Marnie Deal PTA Physical Therapy Assistant                Exercises     Row Name 09/17/18 1300             Subjective Comments    Subjective Comments states that last treatment has her arthritis acting up.   -         Subjective Pain    Able to rate subjective pain? yes  -      Pre-Treatment Pain Level 3  -      Post-Treatment Pain Level --   0 with no movement; 7 with movement  -         Exercise 1    Exercise Name 1 Pro II UE/LE  -      Time 1 10 minutes  -      Additional Comments L 4.0  -         Exercise 2    Exercise Name 2 Pulley's 3 way  -      Time 2 3 minutes each  -         Exercise 3    Exercise Name 3 Post Shld Rolls  -      Reps 3 PRN  -         Exercise 4    Exercise Name 4 Scap Squeezes  -      Reps 4 20  -      Time 4 5 sec hold  -         Exercise 5    Exercise Name 5 No Money  -      Reps 5 20  -      Time 5 5 sec hold  -         Exercise 6    Exercise Name 6 Table Walk Outs for FLexion  -      Reps 6 10  -         Exercise 7    Exercise Name 7 Table Slides for ABD  -      Reps 7 1  -         Exercise 8    Exercise Name 8 wand flexion  -      Reps 8 5  -MH      Additional Comments  stops due to nausea  -        User Key  (r) = Recorded By, (t) = Taken By, (c) = Cosigned By    Initials Name Provider Type     Marnie Deal PTA Physical Therapy Assistant                               PT OP Goals     Row Name 09/17/18 1300          PT Short Term Goals    STG Date to Achieve 10/04/18  -     STG 1 I with all HEP and have additions/changes by next recertification.  -MH     STG 1 Progress Ongoing  -MH     STG 2 AROm R shoulder flexion >= 90°  -     STG 2 Progress Ongoing  -MH     STG 3 AROm R shoulder abduction >= 90°  -     STG 3 Progress Ongoing  -MH     STG 4 AROM R shoulder ER >= C3  -MH     STG 4 Progress Ongoing  -MH     STG 5 AROm R shoulder IR >= L2  -     STG 5 Progress Ongoing  -        Long Term Goals    LTG Date to Achieve 10/19/18  -     LTG 1 AROm R shoulder flexion >= 120°  -     LTG 1 Progress Ongoing  -     LTG 2 AROm R shoulder abduction >= 110°  -     LTG 2 Progress Ongoing  -     LTG 3 R shoulder 3-/5 or greater.  -     LTG 3 Progress Ongoing  -     LTG 4 Patient to be more aware of posture and posture correctoin technique.  -     LTG 4 Progress Ongoing  -     LTG 5 I with final HEP.  -     LTG 5 Progress Ongoing  -     LTG 6 D/C with a final HEP and free 30 day fitness formula memebership.  -        Time Calculation    PT Goal Re-Cert Due Date 10/04/18  -       User Key  (r) = Recorded By, (t) = Taken By, (c) = Cosigned By    Initials Name Provider Type     Marnie Deal PTA Physical Therapy Assistant                         Time Calculation:   Start Time: 1345  Stop Time: 1450  Time Calculation (min): 65 min  PT Non-Billable Time (min): 15 min  Total Timed Code Minutes- PT: 50 minute(s)    Therapy Charges for Today     Code Description Service Date Service Provider Modifiers Qty    37750525929 HC PT THER PROC EA 15 MIN 9/17/2018 Marnie Deal PTA GP 3    18437847657 HC PT THER SUPP EA 15 MIN 9/17/2018 Marnie Deal PTA GP 1                     Marnie Dela, PTA  9/17/2018

## 2018-09-19 ENCOUNTER — HOSPITAL ENCOUNTER (OUTPATIENT)
Dept: PHYSICAL THERAPY | Facility: HOSPITAL | Age: 73
Setting detail: THERAPIES SERIES
Discharge: HOME | End: 2018-09-19

## 2018-09-19 DIAGNOSIS — M25.511 RIGHT SHOULDER PAIN, UNSPECIFIED CHRONICITY: Primary | ICD-10-CM

## 2018-09-19 PROCEDURE — 97110 THERAPEUTIC EXERCISES: CPT

## 2018-09-19 NOTE — THERAPY TREATMENT NOTE
"    Outpatient Physical Therapy Ortho Treatment Note  [facility]  Marnie Deal, LIZ       Patient Name: Kelly Alvarez  : 1945  MRN: [id number]  Today's Date: 2018      Visit Date: 2018     Visits: 3/3  Insurance Visits Approved: based on medicare guidelines  Recert Due: 10/04/2018  MD Appt: 2018  Pain: pretreatment 8/10; post treatment \"a little better but pain rating is the same\"  Improvement: pt is subjectively reporting 0% improvement since initial evaluation    Visit Dx:    ICD-10-CM ICD-9-CM   1. Right shoulder pain, unspecified chronicity M25.511 719.41       Patient Active Problem List   Diagnosis   • Osteoarthritis of knee   • Status post total right knee replacement   • Chronic pain of both knees   • Right shoulder pain   • Pain of both hip joints   • High risk medications (not anticoagulants) long-term use   • Primary osteoarthritis of left knee        Past Medical History:   Diagnosis Date   • Diabetes mellitus (CMS/HCC)    • Diabetic foot ulcer (CMS/HCC)     Normal PRIYA      • Hip pain    • Hyperlipidemia    • Hypertensive disorder    • Hypertensive disorder    • Knee pain    • Osteoarthritis    • Shoulder pain         No past surgical history on file.          PT Ortho     Row Name 18 1400       Subjective Comments    Subjective Comments states that she is hurting more today.   -       Precautions and Contraindications    Precautions/Limitations no known precautions/limitations  -       Subjective Pain    Able to rate subjective pain? yes  -    Pre-Treatment Pain Level 8  -    Row Name 18 1300       Subjective Comments    Subjective Comments states that last treatment has her arthritis acting up.   -       Precautions and Contraindications    Precautions/Limitations no known precautions/limitations  -       Subjective Pain    Able to rate subjective pain? yes  -    Pre-Treatment Pain Level 3  -    Post-Treatment Pain Level " "--   0 with no movement; 7 with movement  -      User Key  (r) = Recorded By, (t) = Taken By, (c) = Cosigned By    Initials Name Provider Type     Marnie Deal PTA Physical Therapy Assistant                            PT Assessment/Plan     Row Name 09/19/18 1400          PT Assessment    Assessment Comments patient is able to do more of the shld flex with wand and tolerates increased activity today.   -        PT Plan    PT Frequency 2x/week  -     PT Plan Comments continue with ROM and scap strengthening. possible st. shld ext next visit  -       User Key  (r) = Recorded By, (t) = Taken By, (c) = Cosigned By    Initials Name Provider Type     Marnie Deal PTA Physical Therapy Assistant                Modalities     Row Name 09/19/18 1400             Ice    Ice Applied Yes  -      Location right shoulder sitting in chair  -      Rx Minutes 15 mins  -      Ice S/P Rx Yes  -        User Key  (r) = Recorded By, (t) = Taken By, (c) = Cosigned By    Initials Name Provider Type     Marnie Deal PTA Physical Therapy Assistant                Exercises     Row Name 09/19/18 1400             Subjective Comments    Subjective Comments states that she is hurting more today.   -         Subjective Pain    Able to rate subjective pain? yes  -      Pre-Treatment Pain Level 8  -      Post-Treatment Pain Level --   \"a little bit better, but pain rating is the same\"  -         Exercise 1    Exercise Name 1 Pro II UE/LE  -      Time 1 10 minutes  -      Additional Comments L 2.0  -         Exercise 2    Exercise Name 2 Pulley's 3 way  -      Time 2 3 minutes each  -         Exercise 3    Exercise Name 3 Post Shld Rolls  -      Reps 3 PRN  -         Exercise 4    Exercise Name 4 Scap Squeezes  -      Reps 4 20  -      Time 4 5 sec hold  -         Exercise 5    Exercise Name 5 No Money  -      Reps 5 20  -      Time 5 5 sec hold  -         Exercise 6    Exercise Name 6 " Tband Rows Mid  -MH      Reps 6 20  -MH      Additional Comments Red  -MH         Exercise 7    Exercise Name 7 Wand Flex  -MH      Sets 7 2  -MH      Reps 7 10  -MH         Exercise 8    Exercise Name 8 Wand ABD  -MH      Reps 8 5  -MH         Exercise 9    Exercise Name 9 Wand IR/ER  -MH      Reps 9 20  -MH        User Key  (r) = Recorded By, (t) = Taken By, (c) = Cosigned By    Initials Name Provider Type     Marnie Deal PTA Physical Therapy Assistant                               PT OP Goals     Row Name 09/19/18 1400          PT Short Term Goals    STG Date to Achieve 10/04/18  -MH     STG 1 I with all HEP and have additions/changes by next recertification.  -MH     STG 1 Progress Ongoing  -MH     STG 2 AROm R shoulder flexion >= 90°  -MH     STG 2 Progress Ongoing  -MH     STG 3 AROm R shoulder abduction >= 90°  -     STG 3 Progress Ongoing  -     STG 4 AROM R shoulder ER >= C3  -     STG 4 Progress Ongoing  -     STG 5 AROm R shoulder IR >= L2  -     STG 5 Progress Ongoing  -        Long Term Goals    LTG Date to Achieve 10/19/18  -     LTG 1 AROm R shoulder flexion >= 120°  -     LTG 1 Progress Ongoing  -     LTG 2 AROm R shoulder abduction >= 110°  -     LTG 2 Progress Ongoing  -     LTG 3 R shoulder 3-/5 or greater.  -     LTG 3 Progress Ongoing  -     LTG 4 Patient to be more aware of posture and posture correctoin technique.  -     LTG 4 Progress Ongoing  -     LTG 5 I with final HEP.  -     LTG 5 Progress Ongoing  -     LTG 6 D/C with a final HEP and free 30 day fitness formula memebership.  -        Time Calculation    PT Goal Re-Cert Due Date 10/04/18  -       User Key  (r) = Recorded By, (t) = Taken By, (c) = Cosigned By    Initials Name Provider Type     Marnie Deal PTA Physical Therapy Assistant          Therapy Education  Education Details: tband rows  Given: HEP, Symptoms/condition management, Pain management, Posture/body mechanics, Mobility  training  Program: Reinforced, New  How Provided: Verbal, Demonstration  Provided to: Patient  Level of Understanding: Verbalized, Demonstrated              Time Calculation:   Start Time: 1345  Stop Time: 1435  Time Calculation (min): 50 min  Total Timed Code Minutes- PT: 50 minute(s)    Therapy Charges for Today     Code Description Service Date Service Provider Modifiers Qty    26714382653 HC PT THER PROC EA 15 MIN 9/19/2018 Marnie Deal PTA GP 3    87529659086 HC PT THER SUPP EA 15 MIN 9/19/2018 Marnie Deal PTA GP 1                    Marnie Deal PTA  9/19/2018

## 2018-09-24 ENCOUNTER — APPOINTMENT (OUTPATIENT)
Dept: PHYSICAL THERAPY | Facility: HOSPITAL | Age: 73
End: 2018-09-24

## 2018-09-27 ENCOUNTER — HOSPITAL ENCOUNTER (OUTPATIENT)
Dept: PHYSICAL THERAPY | Facility: HOSPITAL | Age: 73
Setting detail: THERAPIES SERIES
Discharge: HOME | End: 2018-09-27

## 2018-09-27 DIAGNOSIS — M25.511 RIGHT SHOULDER PAIN, UNSPECIFIED CHRONICITY: Primary | ICD-10-CM

## 2018-09-27 PROCEDURE — 97110 THERAPEUTIC EXERCISES: CPT

## 2018-09-27 NOTE — THERAPY TREATMENT NOTE
"    Outpatient Physical Therapy Ortho Treatment Note  [facility]     Patient Name: Kelly Alvarez  : 1945  MRN: [id number]  Today's Date: 2018      Visit Date: 2018  Pt reports 4/10 pain pre treatment, 6/10 with movement, 0/10 without movement pain post treatment  Reports \"some\"% of improvement.  Attended 4/5 visits.  Insurance available: Medicare guidelines  Next MD appt:2018.  Recertification: 10/04/2018.  Visit Dx:    ICD-10-CM ICD-9-CM   1. Right shoulder pain, unspecified chronicity M25.511 719.41       Patient Active Problem List   Diagnosis   • Osteoarthritis of knee   • Status post total right knee replacement   • Chronic pain of both knees   • Right shoulder pain   • Pain of both hip joints   • High risk medications (not anticoagulants) long-term use   • Primary osteoarthritis of left knee        Past Medical History:   Diagnosis Date   • Diabetes mellitus (CMS/HCC)    • Diabetic foot ulcer (CMS/HCC)     Normal PRIYA      • Hip pain    • Hyperlipidemia    • Hypertensive disorder    • Hypertensive disorder    • Knee pain    • Osteoarthritis    • Shoulder pain         No past surgical history on file.          PT Ortho     Row Name 18 1000       Subjective Comments    Subjective Comments pt stated that she feels better. pt stated that she forgot to take her morning medications. PTA instructed pt to set alarm to cue her to take medication in the morning at the same time. pt states that she forgets often to take medication.  -TL       Precautions and Contraindications    Precautions/Limitations no known precautions/limitations  -TL       Subjective Pain    Able to rate subjective pain? yes  -TL    Post-Treatment Pain Level --   no pain without movement but 6/10 with movement  -TL      User Key  (r) = Recorded By, (t) = Taken By, (c) = Cosigned By    Initials Name Provider Type    TL Niesha Garcia PTA Physical Therapy Assistant                          "   PT Assessment/Plan     Row Name 09/27/18 1100          PT Assessment    Assessment Comments pt tolerated adding YTB to no money ext for scap stabilization. Pt still limited with ROM that is painful.  Pt stated that she forgot to take her medication. PTA explained the importance of taking medications. Pt's bp 140/78 this date. Pt stated that she felt dizzy when getting up.  No new goals met at this time but progressing toward ROM and strengthening.  -TL        PT Plan    PT Frequency 2x/week  -TL     PT Plan Comments start full cans to 90 if able  -TL       User Key  (r) = Recorded By, (t) = Taken By, (c) = Cosigned By    Initials Name Provider Type    TL Niesha Garcia PTA Physical Therapy Assistant                    Exercises     Row Name 09/27/18 1000             Subjective Comments    Subjective Comments pt stated that she feels better. pt stated that she forgot to take her morning medications. PTA instructed pt to set alarm to cue her to take medication in the morning at the same time. pt states that she forgets often to take medication.  -TL         Subjective Pain    Able to rate subjective pain? yes  -TL      Pre-Treatment Pain Level 4  -TL      Post-Treatment Pain Level --   no pain without movement but 6/10 with movement  -TL         Exercise 1    Exercise Name 1 Pro II UE/LE  -TL      Time 1 10mins  -TL      Additional Comments level 3.5  -TL         Exercise 2    Exercise Name 2 Pulley's 3 way  -TL      Time 2 3 minutes each  -TL         Exercise 3    Exercise Name 3 No money TB  -TL      Sets 3 2  -TL      Reps 3 10  -TL      Time 3 5 sec hold  -TL      Additional Comments YTB  -TL         Exercise 4    Exercise Name 4 Shouder rows mid  -TL      Sets 4 2  -TL      Reps 4 10  -TL      Additional Comments RTB seated  -TL         Exercise 5    Exercise Name 5 shoulder rows High  -TL      Additional Comments RTB seated position  -TL         Exercise 6    Exercise Name 6 shoulder ext  -TL      Sets 6 2   -TL      Reps 6 10  -TL      Additional Comments RTB  -TL         Exercise 7    Exercise Name 7 seated wand Flex  -TL      Sets 7 2  -TL      Reps 7 10  -TL         Exercise 8    Exercise Name 8 seated wamd abd  -TL      Reps 8 10  -TL         Exercise 9    Exercise Name 9 seated wand IR/ER  -TL      Reps 9 20  -TL        User Key  (r) = Recorded By, (t) = Taken By, (c) = Cosigned By    Initials Name Provider Type    Niesha Santoro PTA Physical Therapy Assistant                               PT OP Goals     Row Name 09/27/18 1100          PT Short Term Goals    STG Date to Achieve 10/04/18  -TL     STG 1 I with all HEP and have additions/changes by next recertification.  -TL     STG 1 Progress Ongoing  -TL     STG 2 AROm R shoulder flexion >= 90°  -TL     STG 2 Progress Ongoing  -TL     STG 3 AROm R shoulder abduction >= 90°  -TL     STG 3 Progress Ongoing  -TL     STG 4 AROM R shoulder ER >= C3  -TL     STG 4 Progress Ongoing  -TL     STG 5 AROm R shoulder IR >= L2  -TL     STG 5 Progress Ongoing  -TL        Long Term Goals    LTG Date to Achieve 10/19/18  -TL     LTG 1 AROm R shoulder flexion >= 120°  -TL     LTG 1 Progress Ongoing  -TL     LTG 2 AROm R shoulder abduction >= 110°  -TL     LTG 2 Progress Ongoing  -TL     LTG 3 R shoulder 3-/5 or greater.  -TL     LTG 3 Progress Ongoing  -TL     LTG 4 Patient to be more aware of posture and posture correctoin technique.  -TL     LTG 4 Progress Ongoing  -TL     LTG 5 I with final HEP.  -TL     LTG 5 Progress Ongoing  -TL     LTG 6 D/C with a final HEP and free 30 day fitness formula memebership.  -TL        Time Calculation    PT Goal Re-Cert Due Date 10/04/18  -TL       User Key  (r) = Recorded By, (t) = Taken By, (c) = Cosigned By    Initials Name Provider Type    Niesha Santoro PTA Physical Therapy Assistant          Therapy Education  Education Details: TB no money, shoulder ext  Given: HEP, Symptoms/condition management, Pain management, Posture/body  mechanics  Program: New, Reinforced  How Provided: Verbal, Demonstration, Written  Provided to: Patient  Level of Understanding: Verbalized, Demonstrated              Time Calculation:   Start Time: 1045  Stop Time: 1152  Time Calculation (min): 67 min  PT Non-Billable Time (min): 15 min  Total Timed Code Minutes- PT: 52 minute(s)  Therapy Suggested Charges     Code   Minutes Charges    None           Therapy Charges for Today     Code Description Service Date Service Provider Modifiers Qty    62031291553 HC PT THER PROC EA 15 MIN 9/27/2018 Niesha Garcia, LIZ GP 3    40761696889 HC PT THER SUPP EA 15 MIN 9/27/2018 Niesha Garcia PTA GP 1                    Niesha Garcia PTA  9/27/2018

## 2018-10-01 ENCOUNTER — HOSPITAL ENCOUNTER (OUTPATIENT)
Dept: PHYSICAL THERAPY | Facility: HOSPITAL | Age: 73
Setting detail: THERAPIES SERIES
Discharge: HOME | End: 2018-10-01

## 2018-10-01 DIAGNOSIS — M25.511 RIGHT SHOULDER PAIN, UNSPECIFIED CHRONICITY: Primary | ICD-10-CM

## 2018-10-01 PROCEDURE — G8984 CARRY CURRENT STATUS: HCPCS | Performed by: PHYSICAL THERAPIST

## 2018-10-01 PROCEDURE — 97110 THERAPEUTIC EXERCISES: CPT

## 2018-10-01 PROCEDURE — 97110 THERAPEUTIC EXERCISES: CPT | Performed by: PHYSICAL THERAPIST

## 2018-10-01 PROCEDURE — G8985 CARRY GOAL STATUS: HCPCS | Performed by: PHYSICAL THERAPIST

## 2018-10-01 NOTE — THERAPY PROGRESS REPORT/RE-CERT
"    Outpatient Physical Therapy Ortho Progress Note  [facility]  Danielle Hendrickson, PT, DPT, CSCS       Patient Name: Kelly Alvarez  : 1945  MRN: [id number]  Today's Date: 10/1/2018      Visit Date: 10/01/2018     Pt reports 0/10 pain pre treatment, \"numb\"/10 pain post treatment  Reports \"a little, maybe 25%\" of improvement.  Attended 5/6 visits.  Insurance available: medicare guidelines  Next MD appt: 2018.  Recertification: N/A.    Visit Dx:    ICD-10-CM ICD-9-CM   1. Right shoulder pain, unspecified chronicity M25.511 719.41       Patient Active Problem List   Diagnosis   • Osteoarthritis of knee   • Status post total right knee replacement   • Chronic pain of both knees   • Right shoulder pain   • Pain of both hip joints   • High risk medications (not anticoagulants) long-term use   • Primary osteoarthritis of left knee        Past Medical History:   Diagnosis Date   • Diabetes mellitus (CMS/HCC)    • Diabetic foot ulcer (CMS/HCC)     Normal PRIYA      • Hip pain    • Hyperlipidemia    • Hypertensive disorder    • Hypertensive disorder    • Knee pain    • Osteoarthritis    • Shoulder pain         History reviewed. No pertinent surgical history.     Number of days off work: N/A    Changes to medications: None noted.    Changes to MD orders: None noted.          PT Ortho     Row Name 10/01/18 1400       Precautions and Contraindications    Precautions/Limitations no known precautions/limitations  -AJ       Subjective Pain    Post-Treatment Pain Level --   \"numb\"  -AJ       Posture/Observations    Alignment Options Forward head;Cervical lordosis;Thoracic kyphosis;Rounded shoulders;Scapular elevation  -AJ    Forward Head Mild;Increased  -AJ    Cervical Lordosis Mild;Increased  -AJ    Thoracic Kyphosis Mild;Moderate;Increased  -AJ    Rounded Shoulders Bilateral:;Mild;Moderate;Increased  -AJ    Scapular Elevation Bilateral:;Normal  -AJ    Posture/Observations Comments No " acute distress, forward head/shoulder posture  -       Special Tests/Palpation    Special Tests/Palpation --   No specific areas of TTp at rest.  -AJ       Shoulder Impingement/Rotator Cuff Special Tests    Fuller-Jose Luis Test (RC Lesion vs. Bursitis) Right:;Positive  -    Neer Impingement Test (RC Lesion vs. Bursitis) Right:;Positive;Left:;Negative  -    Drop Arm Test (Full Thickness RC Lesion) Right:;Positive  -       Right Upper Ext    Rt Shoulder Abduction AROM 86°  -AJ    Rt Shoulder Abduction PROM ~120°  -AJ    Rt Shoulder Flexion AROM 86°  -AJ    Rt Shoulder Flexion PROM ~120°  -AJ    Rt Shoulder External Rotation AROM T1  -AJ    Rt Shoulder Internal Rotation AROM T10  -AJ       Left Upper Ext    Lt Shoulder Abduction AROM 170°  -AJ    Lt Shoulder Flexion AROM 175°  -AJ    Lt Shoulder External Rotation AROM T1  -AJ    Lt Shoulder Internal Rotation AROM T12  -AJ       MMT (Manual Muscle Testing)    General MMT Comments L UE 4+/5; R UE 3-/5  -       Sensation    Sensation WNL? WNL  -    Light Touch No apparent deficits  -AJ    Additional Comments Denies any numbness or tingling in B UEs  -AJ       Pathomechanics    Upper Extremity Pathomechanics Excessive scapular elevation   R UE  -AJ       Transfers    Comment (Transfers) I with all transfers.  -       Gait/Stairs Assessment/Training    Comment (Gait/Stairs) FWB, non-antagic gait, ambulating with 1 loftstrand crutch in R UE.  -      User Key  (r) = Recorded By, (t) = Taken By, (c) = Cosigned By    Initials Name Provider Type    Danielle Vilchis, PT Physical Therapist         Barriers to Rehab: Include significant or possible arthritic/degenerative changes that have occurred within the joint, The patient's obesity.      Safety Issues: None noted.            PT Assessment/Plan     Row Name 10/01/18 1500          PT Assessment    Functional Limitations Limitation in home management;Limitations in community activities;Performance in  "leisure activities;Performance in self-care ADL  -     Impairments Endurance;Impaired flexibility;Impaired muscle length;Impaired muscle power;Joint integrity;Joint mobility;Muscle strength;Pain;Posture;Range of motion  -     Assessment Comments Patient has some improvement, but still has pain with motion and s/s of RTC involvement.  -     Rehab Potential Good  -AJ     Patient/caregiver participated in establishment of treatment plan and goals Yes  -AJ     Patient would benefit from skilled therapy intervention Yes  -AJ        PT Plan    PT Frequency 2x/week  -AJ     Predicted Duration of Therapy Intervention (Therapy Eval) 2 more weeks, 4 more visits, D/C at the end of next week with a final HEP and free 30 day fitness formula memebership.  -AJ     Planned CPT's? PT RE-EVAL: 12415;PT THER PROC EA 15 MIN: 41581;PT THER ACT EA 15 MIN: 33580;PT MANUAL THERAPY EA 15 MIN: 32381;PT ELECTRICAL STIM UNATTEND: ;PT THER SUPP EA 15 MIN  -     PT Plan Comments Progress overall scap stab and compensation techniques. D/C at the end of next week with a final HEp and free 30 day fitness formula embership.  -       User Key  (r) = Recorded By, (t) = Taken By, (c) = Cosigned By    Initials Name Provider Type    Danielle Vilchis, PT Physical Therapist       Other therapeutic activities and/or exercises will be prescribed depending on the patients progress or lack there of.          Modalities     Row Name 10/01/18 1400             Ice    Ice Applied Yes  -AJ      Location right shoulder sitting in chair  -      Rx Minutes 15 mins  -AJ      Ice S/P Rx Yes  -AJ        User Key  (r) = Recorded By, (t) = Taken By, (c) = Cosigned By    Initials Name Provider Type    Danielle Vilchis, PT Physical Therapist                Exercises     Row Name 10/01/18 1400             Subjective Comments    Subjective Comments Patient states \"If I don't move it it doesn't hurt.\" She also reports she doesn't tihnk she has " "really made any progress since coming ot physical thrapy.  -AJ         Subjective Pain    Able to rate subjective pain? yes  -AJ      Pre-Treatment Pain Level 0  -AJ      Post-Treatment Pain Level --   \"numb\"  -AJ         Exercise 1    Exercise Name 1 pro II UE F/R  -AJ      Time 1 10 minutes  -AJ      Additional Comments L 3.5  -AJ         Exercise 2    Exercise Name 2 Pulley's 3 way  -AJ      Time 2 3 minutes each  -AJ         Exercise 3    Exercise Name 3 No money TB  -TL      Sets 3 2  -TL      Reps 3 10  -TL      Time 3 5 sec hold  -TL      Additional Comments YTB  -TL         Exercise 4    Exercise Name 4 Shouder rows mid  -TL      Sets 4 2  -TL      Reps 4 10  -TL      Time 4 5 sec hold  -TL      Additional Comments RTB seated  -TL         Exercise 5    Exercise Name 5 shoulder rows High  -TL      Sets 5 2  -TL      Reps 5 10  -TL      Time 5 5 sec hold  -TL      Additional Comments seated RTB   -TL         Exercise 6    Exercise Name 6 shoulder ext  -TL      Sets 6 2  -TL      Reps 6 10  -TL      Additional Comments seated RTB  -TL         Exercise 7    Exercise Name 7 IR strap S  -TL      Reps 7 2  -TL      Time 7 1 min  -TL        User Key  (r) = Recorded By, (t) = Taken By, (c) = Cosigned By    Initials Name Provider Type    Danielle Vilchis, PT Physical Therapist    TL Niesha Garcia PTA Physical Therapy Assistant                               PT OP Goals     Row Name 10/01/18 1400          PT Short Term Goals    STG Date to Achieve 10/04/18  -     STG 1 I with all HEP and have additions/changes by next recertification.  -AJ     STG 1 Progress Met  -AJ     STG 2 AROm R shoulder flexion >= 90°  -AJ     STG 2 Progress Ongoing;Progressing;Not Met  -     STG 3 AROm R shoulder abduction >= 90°  -     STG 3 Progress Ongoing;Progressing;Not Met  -AJ     STG 4 AROM R shoulder ER >= C3  -AJ     STG 4 Progress Met  -     STG 5 AROm R shoulder IR >= L2  -AJ     STG 5 Progress Met  -        Long " Term Goals    LTG Date to Achieve 10/19/18  -     LTG 1 AROm R shoulder flexion >= 120°  -     LTG 1 Progress Ongoing;Progressing;Not Met  -     LTG 2 AROm R shoulder abduction >= 110°  -     LTG 2 Progress Ongoing;Progressing;Not Met  -     LTG 3 R shoulder 3-/5 or greater.  -     LTG 3 Progress Ongoing;Progressing;Not Met  -     LTG 4 Patient to be more aware of posture and posture correctoin technique.  -     LTG 4 Progress Ongoing  -     LTG 5 I with final HEP.  -     LTG 5 Progress Ongoing  -     LTG 6 D/C with a final HEP and free 30 day fitness formula memebership.  -        Time Calculation    PT Goal Re-Cert Due Date --   N/A  -       User Key  (r) = Recorded By, (t) = Taken By, (c) = Cosigned By    Initials Name Provider Type    Danielle Vilchis, PT Physical Therapist          Therapy Education  Given: HEP, Symptoms/condition management, Pain management, Posture/body mechanics (POC)  Program: Reinforced  How Provided: Verbal  Provided to: Patient  Level of Understanding: Verbalized    Quick DASH  Open a tight or new jar.: Severe Difficulty  Do heavy household chores (e.g., wash walls, wash floors): Severe Difficulty  Carry a shopping bag or briefcase: No Difficulty  Wash your back: Mild Difficulty  Use a knife to cut food: Mild Difficulty  Recreational activities in which you take some force or impact through your arm, should or hand (e.g. golf, hammering, tennis, etc.): Unable  During the past week, to what extent has your arm, shoulder, or hand problem interfered with your normal social activites with family, friends, neighbors or groups?: Slightly  During the past week, were you limited in your work or other regular daily activities as a result of your arm, shoulder or hand problem?: Very limited  Arm, Shoulder, or hand pain: Mild  Tingling (pins and needles) in your arm, shoulder, or hand: Extreme  During the past week, how much difficulty have you had sleeping because  of the pain in your arm, shoulder or hand?: Mild Difficulty  Number of Questions Answered: 11  Quick DASH Score: 50         Time Calculation:   Start Time: 1421  Stop Time: 1525  Time Calculation (min): 64 min  PT Non-Billable Time (min): 15 min  Total Timed Code Minutes- PT: 49 minute(s)    Therapy Charges for Today     Code Description Service Date Service Provider Modifiers Qty    43826435239 HC PT CARRY MOV HAND OBJ CURRENT 10/1/2018 Danielle Hendrickson, PT GP, CK 1    93393021752 HC PT CARRY MOV HAND OBJ PROJECTED 10/1/2018 Danielle Hendrickson, PT GP, CJ 1    98088797957 HC PT THER PROC EA 15 MIN 10/1/2018 Danielle Hendrickson, PT GP 2    54283553773 HC PT THER SUPP EA 15 MIN 10/1/2018 Danielle Hendrickson, PT GP 1          PT G-Codes  Quick DASH Score: 50  Functional Limitation: Carrying, moving and handling objects  Carrying, Moving and Handling Objects Current Status (): At least 40 percent but less than 60 percent impaired, limited or restricted  Carrying, Moving and Handling Objects Goal Status (): At least 20 percent but less than 40 percent impaired, limited or restricted         Danielle Hendrickson PT, DPT, CSCS  10/1/2018

## 2018-10-04 ENCOUNTER — HOSPITAL ENCOUNTER (OUTPATIENT)
Dept: PHYSICAL THERAPY | Facility: HOSPITAL | Age: 73
Setting detail: THERAPIES SERIES
Discharge: HOME | End: 2018-10-04

## 2018-10-04 DIAGNOSIS — M25.511 RIGHT SHOULDER PAIN, UNSPECIFIED CHRONICITY: Primary | ICD-10-CM

## 2018-10-04 PROCEDURE — 97110 THERAPEUTIC EXERCISES: CPT

## 2018-10-04 NOTE — THERAPY TREATMENT NOTE
Outpatient Physical Therapy Ortho Treatment Note  [facility]     Patient Name: Kelly Alvarez  : 1945  MRN: [id number]  Today's Date: 10/4/2018      Visit Date: 10/04/2018  Visits . Sam MOROCHO/MONIQUE ROSENBERG f/u . 25% improvement.   Visit Dx:    ICD-10-CM ICD-9-CM   1. Right shoulder pain, unspecified chronicity M25.511 719.41       Patient Active Problem List   Diagnosis   • Osteoarthritis of knee   • Status post total right knee replacement   • Chronic pain of both knees   • Right shoulder pain   • Pain of both hip joints   • High risk medications (not anticoagulants) long-term use   • Primary osteoarthritis of left knee        Past Medical History:   Diagnosis Date   • Diabetes mellitus (CMS/HCC)    • Diabetic foot ulcer (CMS/HCC)     Normal PRIYA      • Hip pain    • Hyperlipidemia    • Hypertensive disorder    • Hypertensive disorder    • Knee pain    • Osteoarthritis    • Shoulder pain         No past surgical history on file.          PT Ortho     Row Name 10/04/18 1400       Precautions and Contraindications    Precautions/Limitations no known precautions/limitations  -JOHN       Posture/Observations    Posture/Observations Comments ER very limited with ex. C/O pain with AROM at and above shld level.   -JOHN      User Key  (r) = Recorded By, (t) = Taken By, (c) = Cosigned By    Initials Name Provider Type    Rigo Corcoran PTA Physical Therapy Assistant                            PT Assessment/Plan     Row Name 10/04/18 1400          PT Assessment    Assessment Comments Fair john of today's treatment. Continued limited ROM and RTC pathology s/s.   -        PT Plan    PT Frequency 2x/week  -JOHN     Predicted Duration of Therapy Intervention (Therapy Eval) next week  -     PT Plan Comments Cont PT per POC  -JOHN       User Key  (r) = Recorded By, (t) = Taken By, (c) = Cosigned By    Initials Name Provider Type    Rigo Corcoran PTA Physical Therapy Assistant                 Modalities     Row Name 10/04/18 1400             Ice    Ice Applied Yes  -JW      Location right shoulder sitting in chair  -JW      Rx Minutes 12 mins  -JW      Ice S/P Rx Yes  -JW        User Key  (r) = Recorded By, (t) = Taken By, (c) = Cosigned By    Initials Name Provider Type    JOHN Rigo Alcocer PTA Physical Therapy Assistant                Exercises     Row Name 10/04/18 1400             Subjective Comments    Subjective Comments Pt reports shld pain only with usage.   -JW         Subjective Pain    Able to rate subjective pain? yes  -JW      Pre-Treatment Pain Level 0  -JW         Exercise 1    Exercise Name 1 PRO2  -JW      Additional Comments L 3.5  -JW         Exercise 2    Exercise Name 2 Pulleys  -JW      Sets 2 2  -JW      Time 2 1'  -JW         Exercise 3    Exercise Name 3 Wand flex, abd, CP  -JW      Sets 3 2  -JW      Reps 3 10x ea  -JW      Additional Comments 1#  -JW         Exercise 4    Exercise Name 4 Rows  -JW      Reps 4 30  -JW      Additional Comments red tb  -JW         Exercise 5    Exercise Name 5 ER/IR  -JW      Sets 5 2  -JW      Reps 5 10  -JW      Additional Comments yellow tb  -JW         Exercise 6    Exercise Name 6 Shld ext  -JW      Reps 6 40  -JW      Additional Comments red tb  -JW         Exercise 7    Exercise Name 7 IR strap S  -JW      Time 7 1'  -JW        User Key  (r) = Recorded By, (t) = Taken By, (c) = Cosigned By    Initials Name Provider Type    JOHN AlcocerRigo PTA Physical Therapy Assistant                               PT OP Goals     Row Name 10/04/18 1400          PT Short Term Goals    STG Date to Achieve 10/04/18  -JW     STG 1 I with all HEP and have additions/changes by next recertification.  -JW     STG 1 Progress Met  -JW     STG 2 AROm R shoulder flexion >= 90°  -JW     STG 2 Progress Ongoing;Progressing;Not Met  -JW     STG 3 AROm R shoulder abduction >= 90°  -JW     STG 3 Progress Ongoing;Progressing;Not Met  -JW     STG 4 AROM R shoulder  ER >= C3  -     STG 4 Progress Met  -     STG 5 AROm R shoulder IR >= L2  -     STG 5 Progress Met  -        Long Term Goals    LTG Date to Achieve 10/19/18  -     LTG 1 AROm R shoulder flexion >= 120°  -     LTG 1 Progress Ongoing;Progressing;Not Met  -     LTG 2 AROm R shoulder abduction >= 110°  -     LTG 2 Progress Ongoing;Progressing;Not Met  -     LTG 3 R shoulder 3-/5 or greater.  -     LTG 3 Progress Ongoing;Progressing;Not Met  -     LTG 4 Patient to be more aware of posture and posture correctoin technique.  -     LTG 4 Progress Ongoing  -     LTG 5 I with final HEP.  -     LTG 5 Progress Ongoing  -     LTG 6 D/C with a final HEP and free 30 day fitness formula memebership.  -       User Key  (r) = Recorded By, (t) = Taken By, (c) = Cosigned By    Initials Name Provider Type    Rigo Corcoran PTA Physical Therapy Assistant                         Time Calculation:   Start Time: 1450  Stop Time: 1545  Time Calculation (min): 55 min  PT Non-Billable Time (min): 12 min  Total Timed Code Minutes- PT: 43 minute(s)  Therapy Suggested Charges     Code   Minutes Charges    None           Therapy Charges for Today     Code Description Service Date Service Provider Modifiers Qty    73324314626 HC PT THER SUPP EA 15 MIN 10/4/2018 Rigo Alcocer PTA GP 1    32031529353 HC PT THER PROC EA 15 MIN 10/4/2018 Rigo Alcocer PTA GP 3                    Rigo Alcocer PTA  10/4/2018

## 2018-10-08 ENCOUNTER — APPOINTMENT (OUTPATIENT)
Dept: PHYSICAL THERAPY | Facility: HOSPITAL | Age: 73
End: 2018-10-08

## 2018-10-09 ENCOUNTER — DOCUMENTATION (OUTPATIENT)
Dept: PHYSICAL THERAPY | Facility: HOSPITAL | Age: 73
End: 2018-10-09

## 2018-10-09 DIAGNOSIS — M25.511 RIGHT SHOULDER PAIN, UNSPECIFIED CHRONICITY: Primary | ICD-10-CM

## 2018-10-09 PROCEDURE — G8985 CARRY GOAL STATUS: HCPCS

## 2018-10-09 PROCEDURE — G8986 CARRY D/C STATUS: HCPCS

## 2018-10-11 ENCOUNTER — APPOINTMENT (OUTPATIENT)
Dept: PHYSICAL THERAPY | Facility: HOSPITAL | Age: 73
End: 2018-10-11

## 2019-01-14 ENCOUNTER — TRANSCRIBE ORDERS (OUTPATIENT)
Dept: PHYSICAL THERAPY | Facility: HOSPITAL | Age: 74
End: 2019-01-14

## 2019-01-14 DIAGNOSIS — M25.559 ARTHRALGIA OF HIP, UNSPECIFIED LATERALITY: Primary | ICD-10-CM

## 2019-01-18 ENCOUNTER — APPOINTMENT (OUTPATIENT)
Dept: PHYSICAL THERAPY | Facility: HOSPITAL | Age: 74
End: 2019-01-18

## 2019-01-24 ENCOUNTER — HOSPITAL ENCOUNTER (OUTPATIENT)
Dept: PHYSICAL THERAPY | Facility: HOSPITAL | Age: 74
Setting detail: THERAPIES SERIES
Discharge: HOME | End: 2019-01-24

## 2019-01-24 DIAGNOSIS — M25.552 PAIN OF LEFT HIP JOINT: Primary | ICD-10-CM

## 2019-01-24 PROCEDURE — 97110 THERAPEUTIC EXERCISES: CPT | Performed by: PHYSICAL THERAPIST

## 2019-01-24 PROCEDURE — 97162 PT EVAL MOD COMPLEX 30 MIN: CPT | Performed by: PHYSICAL THERAPIST

## 2019-01-24 NOTE — THERAPY EVALUATION
Outpatient Physical Therapy Ortho Initial Evaluation  [facility]  Danielle Hendrickson, PT, DPT, CSCS       Patient Name: Kelly Alvarez  : 1945  MRN: [id number]  Today's Date: 2019      Visit Date: 2019     Pt reports 0/10 pain pre treatment, 0/10 pain post treatment  Reports N/A% of improvement.  Attended  visits.  Insurance available: medicare guidelines  Next MD appt: LINDA .  Recertification: 2019.    Patient Active Problem List   Diagnosis   • Osteoarthritis of knee   • Status post total right knee replacement   • Chronic pain of both knees   • Right shoulder pain   • Pain of both hip joints   • High risk medications (not anticoagulants) long-term use   • Primary osteoarthritis of left knee        Past Medical History:   Diagnosis Date   • Diabetes mellitus (CMS/HCC)    • Diabetic foot ulcer (CMS/HCC)     Normal PRIYA      • Hip pain    • Hyperlipidemia    • Hypertensive disorder    • Hypertensive disorder    • Knee pain    • Osteoarthritis    • Shoulder pain         Past Surgical History:   Procedure Laterality Date   • APPENDECTOMY     • CHOLECYSTECTOMY     • HYSTERECTOMY     • REPLACEMENT TOTAL KNEE Right 2016   • TOTAL HIP ARTHROPLASTY Left    • TOTAL HIP ARTHROPLASTY Right    • TOTAL HIP ARTHROPLASTY REVISION Left        Visit Dx:     ICD-10-CM ICD-9-CM   1. Pain of left hip joint M25.552 719.45     Number of days off work: N/A    Patient is .    Patient has grown children.    Allergies       CodeineOther (See Comments)   Zanaflex [Tizanidine Hcl]Other (See Comments)      Reviewed by You at 8:37 AM CST     Medications     amitriptyline (ELAVIL) 50 MG tablet     aspirin 81 MG chewable tablet     cloNIDine (CATAPRES) 0.3 MG tablet     COENZYME Q-10 PO     furosemide (LASIX) 40 MG tablet     gabapentin (NEURONTIN) 300 MG capsule     HYDROcodone-acetaminophen (NORCO)  MG per tablet     levoFLOXacin (LEVAQUIN) 500 MG tablet     lisinopril  (PRINIVIL,ZESTRIL) 20 MG tablet     metFORMIN (GLUCOPHAGE) 1000 MG tablet     metFORMIN (GLUCOPHAGE) 500 MG tablet     Naloxegol Oxalate (MOVANTIK) 25 MG tablet     nystatin-triamcinolone (MYCOLOG II) 744318-2.1 UNIT/GM-% cream     omeprazole (priLOSEC) 20 MG capsule     PEDIATRIC MULTIVITAMINS-FL PO          Patient History     Row Name 01/24/19 0800             History    Chief Complaint  Pain  -AJ      Type of Pain  Hip pain  -AJ      Date Current Problem(s) Began  -- ~4 weeks ago  -AJ      Brief Description of Current Complaint  Patient reports that she started having pain in her L hip about 4 weeks ago. She reprots she saw her family MD who referred her for therapy. She reports that shehas not seen an orthopedic. She reports she is moving. SHe reports that the pain is in her buttocks and in the back of her thighs.  -AJ      Previous treatment for THIS PROBLEM  Medication;Pain Management  -AJ      Patient/Caregiver Goals  Relieve pain;Improve mobility;Improve strength  -AJ      Current Tobacco Use  None  -AJ      Smoking Status  Former smoker  -AJ      Patient's Rating of General Health  Fair  -AJ      Hand Dominance  right-handed  -AJ      Occupation/sports/leisure activities  Occupation: retired; Hobbies: needlework, painting, puzzle books, games  -AJ      Patient seeing anyone else for problem(s)?  Yes, pain management  -AJ      What clinical tests have you had for this problem?  X-ray;Blood Work  -AJ      Results of Clinical Tests  Patient reports she was told both are okay.  -AJ      History of Previous Related Injuries  None  -AJ      Are you or can you be pregnant  No  -AJ         Pain     Pain Location  Hip;Back  -AJ      Pain at Present  0  -AJ      Pain at Best  10  -AJ      Pain Frequency  Intermittent  -AJ      Pain Description  Squeezing;Radiating  -AJ      What Performance Factors Make the Current Problem(s) WORSE?  up and moving around  -AJ      What Performance Factors Make the Current Problem(s)  BETTER?  sitting and laying down  -AJ      Tolerance Time- Standing  <2 minutes  -AJ      Tolerance Time- Walking  <2 minutes  -AJ      Is your sleep disturbed?  No  -AJ      Is medication used to assist with sleep?  Yes  -AJ      Difficulties at work?  N/A  -AJ      Difficulties with ADL's?  dressing, showering  -AJ      Difficulties with recreational activities?  walking  -AJ         Fall Risk Assessment    Any falls in the past year:  No  -AJ      Number of falls reported in the last 12 months  0  -AJ      Does patient have a fear of falling  No  -AJ        User Key  (r) = Recorded By, (t) = Taken By, (c) = Cosigned By    Initials Name Provider Type    Danielle Vilchis, PT Physical Therapist          PT Ortho     Row Name 01/24/19 0800       Subjective Comments    Subjective Comments  Patient wishes to get the pain to go away.  -AJ       Precautions and Contraindications    Precautions/Limitations  hip precautions- left;hip precautions- right  -AJ    Precautions  B HIP PRECAUTIONS  (Significant)   -AJ       Subjective Pain    Able to rate subjective pain?  yes  -AJ    Pre-Treatment Pain Level  0  -AJ    Post-Treatment Pain Level  0  -AJ       Posture/Observations    Alignment Options  Forward head;Cervical lordosis;Rounded shoulders;Scoliosis;Lumbar lordosis;Iliac crests  -AJ    Forward Head  Mild;Increased  -AJ    Cervical Lordosis  Mild;Increased  -AJ    Rounded Shoulders  Bilateral:;Mild;Moderate;Increased  -AJ    Scoliosis  Normal  -AJ    Lumbar lordosis  Mild;Decreased flattening  -AJ    Iliac crests  Bilateral:;Normal Pelvis appears to be level, no LLD to note  -AJ    Posture/Observations Comments  No acute distress noted overall. Discomfort noted with sit to/from stand  -AJ       Lumbar/SI Special Tests    Standing Flexion Test (SI Dysfunction)  Bilateral:;Positive  -AJ    Stork Test (SI Dysfunction)  Bilateral:;Unable to test Patient unable to shift to perform SLS even with UE A.  -AJ     Trendelenburg Test (Gluteus Medius Weakness)  Bilateral:;Negative  -AJ    Slump Test (Neural Tension)  Bilateral:;Positive  -AJ    Verónica Farrukh Test (HNP)  Negative  -AJ    SLR (Neural Tension)  Bilateral:;Negative  -AJ    SI Compression Test (SI Dysfunction)  Bilateral:;Negative  -AJ    SI Distraction Test (SI Dysfunction)  Bilateral:;Negative  -AJ    SATURNINO (hip vs. SI Dysfunction)  Left:;Positive;Right:;Negative  -AJ    FAIR Test (Piriformis Syndrome)  Bilateral:;Unable to test due to hip precautions  -AJ    Sacral Spring Test (SI Dysfunction)  Negative  -AJ       Meka's Signs    Superficial and non-anatomical tenderness  Negative  -AJ    Simulation test  Positive  -AJ    Distraction straight leg raise test (sitting vs supine)  Negative  -AJ    Regional disturbances  Negative  -AJ    Overreaction to examination  Negative  -AJ       Head/Neck/Trunk    Trunk Extension AROM  20°,pain with return to neutral  -AJ    Trunk Flexion AROM  88°  -AJ    Trunk Lt Lateral Flexion AROM  75% of range  -AJ    Trunk Rt Lateral Flexion AROM  75% of range  -AJ    Trunk Lt Rotation AROM  50% of range  -AJ    Trunk Rt Rotation AROM  75% of range  -AJ       MMT (Manual Muscle Testing)    General MMT Comments  B LE 5/5 except B hip abd, ext 4/5, no change in pain.  -AJ       Sensation    Sensation WNL?  WFL  -AJ    Light Touch  No apparent deficits  -AJ    Additional Comments  Reports rad pain and burning in B posterior thighs  -AJ       Lower Extremity Flexibility    Hamstrings  Bilateral:;Mildly limited  -AJ       Pathomechanics    Spine Pathomechanics  Excessive thoracic kyphosis with forward bend;Limited lumbar flattening with forward bend;Hinges into extension at one segment in lumbar;Bends knees with attempted lumbar extension  -AJ       Transfers    Comment (Transfers)  I with all transfers  -AJ       Gait/Stairs Assessment/Training    Garden Level (Gait)  conditional independence  -AJ    Assistive Device (Gait)  walker,  front-wheeled  -AJ    Distance in Feet (Gait)  ~100'  -AJ    Pattern (Gait)  step-through  -AJ    Deviations/Abnormal Patterns (Gait)  left sided deviations;antalgic;jadon decreased;gait speed decreased;stride length decreased  -AJ    Comment (Gait/Stairs)  Slowed gait with RW, antalgic on L, labored gait.  -      User Key  (r) = Recorded By, (t) = Taken By, (c) = Cosigned By    Initials Name Provider Type    Danielle Vilchis, AFSANEH Physical Therapist                      Therapy Education  Given: HEP, Symptoms/condition management, Pain management, Posture/body mechanics, Mobility training(POC)  Program: Reinforced  How Provided: Verbal, Demonstration, Written  Provided to: Patient  Level of Understanding: Verbalized, Demonstrated     PT OP Goals     Row Name 01/24/19 0800          PT Short Term Goals    STG Date to Achieve  02/14/19  -     STG 1  I with all HEP and have additions/changes by next recertification.  -     STG 2  AROM B lumbar SB WNL.  -     STG 3  AROM B lumbar ROT.  -     STG 4  Patient to report rad pain is come/go versus constant.  -     STG 5  Patient able to show proper log roll technique.  -        Long Term Goals    LTG Date to Achieve  03/01/19  -     LTG 1  AROM for lumbar spine all WNL< no increase in pain.  -     LTG 2  B LE 5/5.  -     LTG 3  Negative slump B.  -     LTG 4  Patient able to ambulate non-antalgically with or without an assistive device >= 600'  -     LTG 5  Patient able to control rad s/s with ther ex.  -     LTG 6  I with final HEP.  -     LTG 7  D/C with a final HEP and free 30 day fitness formula memembership.  -        Time Calculation    PT Goal Re-Cert Due Date  02/14/19  -       User Key  (r) = Recorded By, (t) = Taken By, (c) = Cosigned By    Initials Name Provider Type    Danielle Vilchis, PT Physical Therapist         Barriers to Rehab: Include significant or possible arthritic/degenerative changes that have occurred  within the spine, The patient's obesity.    Safety Issues: B hip precautions.      PT Assessment/Plan     Row Name 01/24/19 0900          PT Assessment    Functional Limitations  Limitation in home management;Limitations in community activities;Performance in leisure activities;Performance in self-care ADL;Impaired gait;Impaired locomotion  -     Impairments  Endurance;Impaired flexibility;Impaired muscle length;Impaired muscle power;Joint integrity;Joint mobility;Muscle strength;Pain;Posture;Range of motion;Gait;Impaired muscle endurance;Poor body mechanics  -     Assessment Comments  Patient slow and guarded with ther ex but able t ocomplete. paitent given written copy of HEP exercises.  -     Rehab Potential  Fair  -     Patient/caregiver participated in establishment of treatment plan and goals  Yes  -     Patient would benefit from skilled therapy intervention  Yes  -AJ        PT Plan    PT Frequency  2x/week  -     Predicted Duration of Therapy Intervention (Therapy Eval)  3-5 weeks, 6-10 visits  -     Planned CPT's?  PT EVAL MOD COMPLELITY: 59076;PT RE-EVAL: 55744;PT THER PROC EA 15 MIN: 11628;PT THER ACT EA 15 MIN: 04472;PT MANUAL THERAPY EA 15 MIN: 35376;PT ELECTRICAL STIM UNATTEND: ;PT THER SUPP EA 15 MIN  -     Physical Therapy Interventions (Optional Details)  gait training;gross motor skills;home exercise program;joint mobilization;lumbar stabilization;manual therapy techniques;modalities;patient/family education;postural re-education;ROM (Range of Motion);strengthening;stretching;transfer training Patient defers aquatics at this time.  -     PT Plan Comments  progress overall ROM, strength, centralization of rad s/s and teach proper body mechanics.  -       User Key  (r) = Recorded By, (t) = Taken By, (c) = Cosigned By    Initials Name Provider Type    Danielle Vilchis, PT Physical Therapist       Other therapeutic activities and/or exercises will be prescribed  "depending on the patients progress or lack there of.    Modalities     Row Name 01/24/19 0800             Ice    Ice Applied  Yes  -AJ      Location  LB- sitting in chair  -AJ      Rx Minutes  15 mins  -AJ      Ice S/P Rx  Yes  -AJ        User Key  (r) = Recorded By, (t) = Taken By, (c) = Cosigned By    Initials Name Provider Type    Danielle Vilchis, PT Physical Therapist        Exercises     Row Name 01/24/19 0800             Subjective Comments    Subjective Comments  Patient wishes to get the pain to go away.  -AJ         Subjective Pain    Able to rate subjective pain?  yes  -AJ      Pre-Treatment Pain Level  0  -AJ      Post-Treatment Pain Level  0  -AJ         Exercise 1    Exercise Name 1  LTR  -AJ      Reps 1  10  -AJ      Time 1  10\" hold  -AJ         Exercise 2    Exercise Name 2  PPT  -AJ      Sets 2  2  -AJ      Reps 2  10  -AJ      Time 2  5\" hold  -AJ         Exercise 3    Exercise Name 3  Supine figure 4 piriformis S with towel.  -AJ      Reps 3  2  -AJ      Time 3  30 seconds  -AJ         Exercise 4    Exercise Name 4  JL  -AJ      Time 4  5 minutes  -AJ        User Key  (r) = Recorded By, (t) = Taken By, (c) = Cosigned By    Initials Name Provider Type    Danielle Vilchis, PT Physical Therapist                        Outcome Measure Options: Lower Extremity Functional Scale (LEFS)  Lower Extremity Functional Index  Any of your usual work, housework or school activities: Quite a bit of difficulty  Your usual hobbies, recreational or sporting activities: Extreme difficulty or unable to perform activity  Getting into or out of the bath: Quite a bit of difficulty  Walking between rooms: Quite a bit of difficulty  Putting on your shoes or socks: Quite a bit of difficulty  Squatting: Extreme difficulty or unable to perform activity  Lifting an object, like a bag of groceries from the floor: Quite a bit of difficulty  Performing light activities around your home: Quite a bit of " difficulty  Performing heavy activities around your home: Extreme difficulty or unable to perform activity  Getting into or out of a car: Extreme difficulty or unable to perform activity  Walking 2 blocks: Extreme difficulty or unable to perform activity  Walking a mile: Extreme difficulty or unable to perform activity  Going up or down 10 stairs (about 1 flight of stairs): Extreme difficulty or unable to perform activity  Standing for 1 hour: Extreme difficulty or unable to perform activity  Sitting for 1 hour: A little bit of difficulty  Running on even ground: Extreme difficulty or unable to perform activity  Running on uneven ground: Extreme difficulty or unable to perform activity  Making sharp turns while running fast: Extreme difficulty or unable to perform activity  Hopping: Extreme difficulty or unable to perform activity  Rolling over in bed: Moderate difficulty  Total: 11      Time Calculation:   Start Time: 0825  Stop Time: 0926  Time Calculation (min): 61 min  PT Non-Billable Time (min): 15 min  Total Timed Code Minutes- PT: 23 minute(s)     Therapy Charges for Today     Code Description Service Date Service Provider Modifiers Qty    59206960466 HC PT EVAL MOD COMPLEXITY 2 1/24/2019 Danielle Hendrickson, PT GP 1    63688139467 HC PT THER PROC EA 15 MIN 1/24/2019 Danielle Hendrickson, PT GP 2    73002576007 HC PT THER SUPP EA 15 MIN 1/24/2019 Danielle Hendrickson, PT GP 1          PT G-Codes  Outcome Measure Options: Lower Extremity Functional Scale (LEFS)  Total: 11         Danielle Hendrickson PT, DPT, CSCS  1/24/2019

## 2019-01-25 ENCOUNTER — APPOINTMENT (OUTPATIENT)
Dept: PHYSICAL THERAPY | Facility: HOSPITAL | Age: 74
End: 2019-01-25

## 2019-01-29 ENCOUNTER — HOSPITAL ENCOUNTER (OUTPATIENT)
Dept: PHYSICAL THERAPY | Facility: HOSPITAL | Age: 74
Setting detail: THERAPIES SERIES
Discharge: HOME | End: 2019-01-29

## 2019-01-29 DIAGNOSIS — M25.552 PAIN OF LEFT HIP JOINT: Primary | ICD-10-CM

## 2019-01-29 PROCEDURE — 97110 THERAPEUTIC EXERCISES: CPT

## 2019-01-29 NOTE — THERAPY TREATMENT NOTE
Outpatient Physical Therapy Ortho Treatment Note  [facility]     Patient Name: Kelly Alvarez  : 1945  MRN: [id number]  Today's Date: 2019      Visit Date: 2019  Visits /. Recert . MD f/isauro MCKEON. % improvement.   Visit Dx:    ICD-10-CM ICD-9-CM   1. Pain of left hip joint M25.552 719.45       Patient Active Problem List   Diagnosis   • Osteoarthritis of knee   • Status post total right knee replacement   • Chronic pain of both knees   • Right shoulder pain   • Pain of both hip joints   • High risk medications (not anticoagulants) long-term use   • Primary osteoarthritis of left knee        Past Medical History:   Diagnosis Date   • Diabetes mellitus (CMS/HCC)    • Diabetic foot ulcer (CMS/HCC)     Normal PRIYA      • Hip pain    • Hyperlipidemia    • Hypertensive disorder    • Hypertensive disorder    • Knee pain    • Osteoarthritis    • Shoulder pain         Past Surgical History:   Procedure Laterality Date   • APPENDECTOMY     • CHOLECYSTECTOMY     • HYSTERECTOMY     • REPLACEMENT TOTAL KNEE Right    • TOTAL HIP ARTHROPLASTY Left    • TOTAL HIP ARTHROPLASTY Right    • TOTAL HIP ARTHROPLASTY REVISION Left        PT Ortho     Row Name 19 1400       Precautions and Contraindications    Precautions/Limitations  hip precautions- bilateral  -    Precautions  B HIP PRECAUTIONS  (Significant)   -    Row Name 19 1300       Precautions and Contraindications    Precautions  --  -      User Key  (r) = Recorded By, (t) = Taken By, (c) = Cosigned By    Initials Name Provider Type    Rigo Corcoran, PTA Physical Therapy Assistant                      PT Assessment/Plan     Row Name 19 1400          PT Assessment    Assessment Comments  Treatment time limited by pt tardiness. Good john of today's visit. T/F technique needs improvement for decrease in pain.   -JOHN        PT Plan    PT Frequency  2x/week  -JOHN     Predicted Duration of Therapy Intervention  "(Therapy Eval)  3-5 weeks  -JW     PT Plan Comments  Cont per POC. Increase ther ex as able.   -JW       User Key  (r) = Recorded By, (t) = Taken By, (c) = Cosigned By    Initials Name Provider Type    Rigo Corcoran PTA Physical Therapy Assistant          Modalities     Row Name 01/29/19 1400             Ice    Ice Applied  Yes  -JW      Location  LB- sitting in chair  -JW      Rx Minutes  10 mins  -JW      Ice S/P Rx  Yes  -JW        User Key  (r) = Recorded By, (t) = Taken By, (c) = Cosigned By    Initials Name Provider Type    Rigo Corcoran PTA Physical Therapy Assistant          Exercises     Row Name 01/29/19 1400             Subjective Comments    Subjective Comments  Pt c/o L groin into post hip area pain.   -JW         Subjective Pain    Able to rate subjective pain?  yes  -JW      Pre-Treatment Pain Level  4  -JW      Post-Treatment Pain Level  3  -JW      Subjective Pain Comment  Pain with rolling and sit-supine t/f's.   -JW         Exercise 1    Exercise Name 1  LTR  -JW      Reps 1  10  -JW         Exercise 2    Exercise Name 2  BKLL  -JW      Sets 2  2  -JW      Reps 2  10  -JW         Exercise 3    Exercise Name 3  BKFO  -JW      Sets 3  2  -JW      Reps 3  10  -JW         Exercise 4    Exercise Name 4  Supine HS S  -JW      Sets 4  2  -JW      Time 4  30\"  -JW         Exercise 5    Exercise Name 5  Supine piriformis S. L  -JW      Sets 5  2  -JW      Time 5  30\"  -JW         Exercise 6    Exercise Name 6  JL  -JW      Time 6  4'  -JW         Exercise 7    Exercise Name 7  Seated trunk ext isometric  -JW      Sets 7  2  -JW      Reps 7  10  -JW      Time 7  3\"  -JW         Exercise 8    Exercise Name 8  // bars: sidestep  -JW      Reps 8  4 trips  -JW        User Key  (r) = Recorded By, (t) = Taken By, (c) = Cosigned By    Initials Name Provider Type    Rigo Corcoran PTA Physical Therapy Assistant                         PT OP Goals     Row Name 01/29/19 1400          PT Short " Term Goals    STG Date to Achieve  02/14/19  -     STG 1  I with all HEP and have additions/changes by next recertification.  -     STG 2  AROM B lumbar SB WNL.  -     STG 3  AROM B lumbar ROT.  -     STG 4  Patient to report rad pain is come/go versus constant.  -     STG 5  Patient able to show proper log roll technique.  -        Long Term Goals    LTG Date to Achieve  03/01/19  -     LTG 1  AROM for lumbar spine all WNL< no increase in pain.  -     LTG 2  B LE 5/5.  -     LTG 3  Negative slump B.  -     LTG 4  Patient able to ambulate non-antalgically with or without an assistive device >= 600'  -     LTG 5  Patient able to control rad s/s with ther ex.  -     LTG 6  I with final HEP.  -     LTG 7  D/C with a final HEP and free 30 day fitness formula [facility].  -       User [place]  (r) = Recorded By, (t) = Taken By, (c) = Cosigned By    Initials Name Provider Type    Rigo Corcoran PTA Physical Therapy Assistant                         Time Calculation:   Start Time: 1356  Stop Time: 1442  Time Calculation (min): 46 min  PT Non-Billable Time (min): 10 min  Total Timed Code Minutes- PT: 36 minute(s)  Therapy Suggested Charges     Code   Minutes Charges    None           Therapy Charges for Today     Code Description Service Date Service Provider Modifiers Qty    45285564644 HC PT THER SUPP EA 15 MIN 1/29/2019 Rigo Alcocer PTA GP 1    15711477346 HC PT THER PROC EA 15 MIN 1/29/2019 Rigo Alcocer PTA GP 2                    Rigo Alcocer PTA  1/29/2019

## 2019-01-31 ENCOUNTER — HOSPITAL ENCOUNTER (OUTPATIENT)
Dept: PHYSICAL THERAPY | Facility: HOSPITAL | Age: 74
Setting detail: THERAPIES SERIES
Discharge: HOME | End: 2019-01-31

## 2019-01-31 DIAGNOSIS — M25.511 RIGHT SHOULDER PAIN, UNSPECIFIED CHRONICITY: ICD-10-CM

## 2019-01-31 DIAGNOSIS — M25.552 PAIN OF LEFT HIP JOINT: Primary | ICD-10-CM

## 2019-01-31 PROCEDURE — 97535 SELF CARE MNGMENT TRAINING: CPT

## 2019-01-31 PROCEDURE — 97110 THERAPEUTIC EXERCISES: CPT

## 2019-02-04 ENCOUNTER — APPOINTMENT (OUTPATIENT)
Dept: PHYSICAL THERAPY | Facility: HOSPITAL | Age: 74
End: 2019-02-04

## 2019-02-06 ENCOUNTER — HOSPITAL ENCOUNTER (OUTPATIENT)
Dept: MRI IMAGING | Facility: HOSPITAL | Age: 74
Discharge: HOME | End: 2019-02-06
Admitting: NURSE PRACTITIONER

## 2019-02-06 DIAGNOSIS — M54.5 LOW BACK PAIN, UNSPECIFIED BACK PAIN LATERALITY, UNSPECIFIED CHRONICITY, WITH SCIATICA PRESENCE UNSPECIFIED: ICD-10-CM

## 2019-02-06 PROCEDURE — 72148 MRI LUMBAR SPINE W/O DYE: CPT

## 2019-02-07 ENCOUNTER — TELEPHONE (OUTPATIENT)
Dept: PHYSICAL THERAPY | Facility: HOSPITAL | Age: 74
End: 2019-02-07

## 2019-02-15 ENCOUNTER — TELEPHONE (OUTPATIENT)
Dept: PHYSICAL THERAPY | Facility: HOSPITAL | Age: 74
End: 2019-02-15

## 2019-02-15 ENCOUNTER — DOCUMENTATION (OUTPATIENT)
Dept: PHYSICAL THERAPY | Facility: HOSPITAL | Age: 74
End: 2019-02-15

## 2019-02-15 DIAGNOSIS — M25.552 PAIN OF LEFT HIP JOINT: Primary | ICD-10-CM

## 2019-02-15 NOTE — TELEPHONE ENCOUNTER
Patient called and spoke with office staff very apologetic about missing appointments. Reports that she had 3 falls in one day on Feb 6th and had to call the ambulance to take her to the emergency room. Reports that they think that she had a light stroke. She is now doing home health until she gets better and then will transfer to outpatient again.

## 2019-03-26 ENCOUNTER — TRANSCRIBE ORDERS (OUTPATIENT)
Dept: ORTHOPEDIC SURGERY | Facility: CLINIC | Age: 74
End: 2019-03-26

## 2019-03-26 DIAGNOSIS — M48.061 SPINAL STENOSIS OF LUMBAR REGION AT MULTIPLE LEVELS: Primary | ICD-10-CM

## 2019-05-20 ENCOUNTER — OFFICE VISIT (OUTPATIENT)
Dept: FAMILY MEDICINE CLINIC | Facility: CLINIC | Age: 74
End: 2019-05-20

## 2019-05-20 VITALS
SYSTOLIC BLOOD PRESSURE: 172 MMHG | BODY MASS INDEX: 36.48 KG/M2 | OXYGEN SATURATION: 95 % | HEIGHT: 66 IN | HEART RATE: 88 BPM | TEMPERATURE: 98 F | DIASTOLIC BLOOD PRESSURE: 98 MMHG | WEIGHT: 227 LBS

## 2019-05-20 DIAGNOSIS — J01.90 ACUTE SINUSITIS, RECURRENCE NOT SPECIFIED, UNSPECIFIED LOCATION: Primary | ICD-10-CM

## 2019-05-20 DIAGNOSIS — I10 ESSENTIAL HYPERTENSION: ICD-10-CM

## 2019-05-20 DIAGNOSIS — R05.9 COUGH: ICD-10-CM

## 2019-05-20 PROCEDURE — 99203 OFFICE O/P NEW LOW 30 MIN: CPT | Performed by: NURSE PRACTITIONER

## 2019-05-20 RX ORDER — DEXTROMETHORPHAN HYDROBROMIDE AND PROMETHAZINE HYDROCHLORIDE 15; 6.25 MG/5ML; MG/5ML
5 SYRUP ORAL 4 TIMES DAILY PRN
Qty: 180 ML | Refills: 0 | Status: SHIPPED | OUTPATIENT
Start: 2019-05-20 | End: 2019-10-10

## 2019-05-20 RX ORDER — FLUCONAZOLE 150 MG/1
TABLET ORAL
Qty: 2 TABLET | Refills: 0 | Status: SHIPPED | OUTPATIENT
Start: 2019-05-20 | End: 2019-10-10

## 2019-05-20 RX ORDER — AMOXICILLIN AND CLAVULANATE POTASSIUM 875; 125 MG/1; MG/1
1 TABLET, FILM COATED ORAL EVERY 12 HOURS SCHEDULED
Qty: 20 TABLET | Refills: 0 | Status: SHIPPED | OUTPATIENT
Start: 2019-05-20 | End: 2019-05-30

## 2019-05-20 RX ORDER — LANCETS
EACH MISCELLANEOUS
COMMUNITY

## 2019-05-20 RX ORDER — MECLIZINE HYDROCHLORIDE 25 MG/1
TABLET ORAL
COMMUNITY
End: 2019-11-11 | Stop reason: SDUPTHER

## 2019-05-20 NOTE — PATIENT INSTRUCTIONS

## 2019-05-20 NOTE — PROGRESS NOTES
"Subjective   Kelly Alvarez is a 73 y.o. female.     [facility] Walk [facility] Clinic Visit    PCP: MARGARITO Nieves    CC: \"cold or flu like symptoms--cough, weakness, nose running\"    PMH: chronic spinal stenosis and back pain--seen by pain management        URI    This is a new problem. The current episode started in the past 7 days. The problem has been gradually worsening. There has been no fever. Associated symptoms include congestion, coughing, headaches, rhinorrhea (yelllow, blood tinged) and a sore throat (scratchy). Pertinent negatives include no abdominal pain, chest pain, diarrhea, dysuria, ear pain, joint pain, joint swelling, nausea, neck pain, plugged ear sensation, rash, sinus pain, sneezing, swollen glands, vomiting or wheezing. Treatments tried: took 2 old Doxycycline. The treatment provided no relief.        The following portions of the patient's history were reviewed and updated as appropriate: allergies, current medications, past medical history, past social history, past surgical history and problem list.    Review of Systems   Constitutional: Negative for appetite change, chills, fatigue and fever.   HENT: Positive for congestion, postnasal drip, rhinorrhea (yelllow, blood tinged), sinus pressure and sore throat (scratchy). Negative for ear discharge, ear pain and sneezing.    Eyes: Negative for discharge and itching.   Respiratory: Positive for cough. Negative for chest tightness, shortness of breath and wheezing.    Cardiovascular: Negative for chest pain and leg swelling.   Gastrointestinal: Negative for abdominal pain, diarrhea, nausea and vomiting.   Genitourinary: Negative for difficulty urinating and dysuria.   Musculoskeletal: Negative for joint pain and neck pain.   Skin: Negative for rash.   Neurological: Positive for headache. Negative for dizziness.     /98 (BP Location: Left arm, Patient Position: Sitting, Cuff Size: Adult)   Pulse 88   Temp 98 °F (36.7 °C) (Tympanic)   Ht " "166.4 cm (65.5\")   Wt 103 kg (227 lb)   SpO2 95%   BMI 37.20 kg/m²   Has not taken b/p meds today    Objective   Physical Exam   Constitutional: She is oriented to person, place, and time. She appears well-developed and well-nourished. No distress (no distress, but does not appear to feel well).   HENT:   Head: Normocephalic and atraumatic.   Right Ear: Tympanic membrane is not erythematous ( dull).   Left Ear: Tympanic membrane is not erythematous ( dull).   Nose: Mucosal edema and congestion present. Right sinus exhibits maxillary sinus tenderness. Right sinus exhibits no frontal sinus tenderness. Left sinus exhibits maxillary sinus tenderness. Left sinus exhibits no frontal sinus tenderness.   Mouth/Throat: Uvula is midline and mucous membranes are normal. Posterior oropharyngeal erythema ( mild injection with PND) present. No oropharyngeal exudate.   Canals are waxy     Eyes: Conjunctivae are normal. Right eye exhibits no discharge. Left eye exhibits no discharge.   Eyes are puffy     Neck: Neck supple.   Cardiovascular: Normal rate and regular rhythm.   Pulmonary/Chest: Effort normal. She has no wheezes. She has no rales.   Congested cough noted   Lymphadenopathy:     She has no cervical adenopathy.   Neurological: She is alert and oriented to person, place, and time.   Nursing note and vitals reviewed.    No results found for this or any previous visit (from the past 24 hour(s)).      Assessment/Plan   Kelly was seen today for cough, nasal congestion and fatigue.    Diagnoses and all orders for this visit:    Acute sinusitis, recurrence not specified, unspecified location  -     amoxicillin-clavulanate (AUGMENTIN) 875-125 MG per tablet; Take 1 tablet by mouth Every 12 (Twelve) Hours for 10 days.    Cough  -     promethazine-dextromethorphan (PROMETHAZINE-DM) 6.25-15 MG/5ML syrup; Take 5 mL by mouth 4 (Four) Times a Day As Needed for Cough.    Essential hypertension    Other orders  -     fluconazole " (DIFLUCAN) 150 MG tablet; 1 tab po x 1 now, may repeat in 4 days prn yeast      Push fluids  Rest  Rx for Augmentin, Phenergan DM provided  Has nasal spray at home to use as needed  Cool mist humidifier    Take b/p meds daily as prescribed    Patient's Body mass index is 37.2 kg/m². BMI is above normal parameters. Recommendations include: referral to primary care     See PCP or RTC if symptoms persist/worsen  See PCP for routine f/u visit and management of chronic medical conditions    .

## 2019-09-24 ENCOUNTER — APPOINTMENT (OUTPATIENT)
Dept: LAB | Facility: HOSPITAL | Age: 74
End: 2019-09-24

## 2019-09-24 ENCOUNTER — OFFICE VISIT (OUTPATIENT)
Dept: FAMILY MEDICINE CLINIC | Facility: CLINIC | Age: 74
End: 2019-09-24

## 2019-09-24 VITALS
HEIGHT: 66 IN | WEIGHT: 234.6 LBS | HEART RATE: 83 BPM | DIASTOLIC BLOOD PRESSURE: 80 MMHG | RESPIRATION RATE: 20 BRPM | SYSTOLIC BLOOD PRESSURE: 140 MMHG | BODY MASS INDEX: 37.7 KG/M2 | TEMPERATURE: 98.4 F | OXYGEN SATURATION: 98 %

## 2019-09-24 DIAGNOSIS — Z11.59 ENCOUNTER FOR HEPATITIS C SCREENING TEST FOR LOW RISK PATIENT: ICD-10-CM

## 2019-09-24 DIAGNOSIS — Z23 NEEDS FLU SHOT: Primary | ICD-10-CM

## 2019-09-24 DIAGNOSIS — Z00.00 WELL ADULT EXAM: ICD-10-CM

## 2019-09-24 DIAGNOSIS — I10 ESSENTIAL HYPERTENSION: ICD-10-CM

## 2019-09-24 DIAGNOSIS — Z13.1 SCREENING FOR DIABETES MELLITUS: ICD-10-CM

## 2019-09-24 DIAGNOSIS — Z13.29 SCREENING FOR THYROID DISORDER: ICD-10-CM

## 2019-09-24 DIAGNOSIS — E11.65 TYPE 2 DIABETES MELLITUS WITH HYPERGLYCEMIA, WITHOUT LONG-TERM CURRENT USE OF INSULIN (HCC): ICD-10-CM

## 2019-09-24 DIAGNOSIS — M94.9 DISORDER OF CARTILAGE: ICD-10-CM

## 2019-09-24 DIAGNOSIS — Z13.220 SCREENING FOR LIPOID DISORDERS: ICD-10-CM

## 2019-09-24 PROCEDURE — 82306 VITAMIN D 25 HYDROXY: CPT | Performed by: NURSE PRACTITIONER

## 2019-09-24 PROCEDURE — 82570 ASSAY OF URINE CREATININE: CPT | Performed by: NURSE PRACTITIONER

## 2019-09-24 PROCEDURE — 86803 HEPATITIS C AB TEST: CPT | Performed by: NURSE PRACTITIONER

## 2019-09-24 PROCEDURE — 85025 COMPLETE CBC W/AUTO DIFF WBC: CPT | Performed by: NURSE PRACTITIONER

## 2019-09-24 PROCEDURE — G0008 ADMIN INFLUENZA VIRUS VAC: HCPCS | Performed by: NURSE PRACTITIONER

## 2019-09-24 PROCEDURE — 83036 HEMOGLOBIN GLYCOSYLATED A1C: CPT | Performed by: NURSE PRACTITIONER

## 2019-09-24 PROCEDURE — 90674 CCIIV4 VAC NO PRSV 0.5 ML IM: CPT | Performed by: NURSE PRACTITIONER

## 2019-09-24 PROCEDURE — 82043 UR ALBUMIN QUANTITATIVE: CPT | Performed by: NURSE PRACTITIONER

## 2019-09-24 PROCEDURE — 84443 ASSAY THYROID STIM HORMONE: CPT | Performed by: NURSE PRACTITIONER

## 2019-09-24 PROCEDURE — 85007 BL SMEAR W/DIFF WBC COUNT: CPT | Performed by: NURSE PRACTITIONER

## 2019-09-24 PROCEDURE — 80061 LIPID PANEL: CPT | Performed by: NURSE PRACTITIONER

## 2019-09-24 PROCEDURE — 80053 COMPREHEN METABOLIC PANEL: CPT | Performed by: NURSE PRACTITIONER

## 2019-09-24 PROCEDURE — 99203 OFFICE O/P NEW LOW 30 MIN: CPT | Performed by: NURSE PRACTITIONER

## 2019-09-25 LAB
25(OH)D3 SERPL-MCNC: 33.7 NG/ML (ref 30–100)
ALBUMIN SERPL-MCNC: 4.6 G/DL (ref 3.5–5.2)
ALBUMIN UR-MCNC: <1.2 MG/DL
ALBUMIN/GLOB SERPL: 1.4 G/DL
ALP SERPL-CCNC: 94 U/L (ref 39–117)
ALT SERPL W P-5'-P-CCNC: 17 U/L (ref 1–33)
ANION GAP SERPL CALCULATED.3IONS-SCNC: 10 MMOL/L (ref 5–15)
AST SERPL-CCNC: 25 U/L (ref 1–32)
BASOPHILS # BLD MANUAL: 0.05 10*3/MM3 (ref 0–0.2)
BASOPHILS NFR BLD AUTO: 1 % (ref 0–1.5)
BILIRUB SERPL-MCNC: 0.2 MG/DL (ref 0.2–1.2)
BUN BLD-MCNC: 19 MG/DL (ref 8–23)
BUN/CREAT SERPL: 22.6 (ref 7–25)
CALCIUM SPEC-SCNC: 9.6 MG/DL (ref 8.6–10.5)
CHLORIDE SERPL-SCNC: 96 MMOL/L (ref 98–107)
CHOLEST SERPL-MCNC: 225 MG/DL (ref 0–200)
CO2 SERPL-SCNC: 29 MMOL/L (ref 22–29)
CREAT BLD-MCNC: 0.84 MG/DL (ref 0.57–1)
CREAT UR-MCNC: 69.4 MG/DL
DEPRECATED RDW RBC AUTO: 43 FL (ref 37–54)
EOSINOPHIL # BLD MANUAL: 0.21 10*3/MM3 (ref 0–0.4)
EOSINOPHIL NFR BLD MANUAL: 4 % (ref 0.3–6.2)
ERYTHROCYTE [DISTWIDTH] IN BLOOD BY AUTOMATED COUNT: 13.8 % (ref 12.3–15.4)
GFR SERPL CREATININE-BSD FRML MDRD: 66 ML/MIN/1.73
GLOBULIN UR ELPH-MCNC: 3.3 GM/DL
GLUCOSE BLD-MCNC: 100 MG/DL (ref 65–99)
HBA1C MFR BLD: 6.7 % (ref 4.8–5.6)
HCT VFR BLD AUTO: 45.5 % (ref 34–46.6)
HCV AB SER DONR QL: NORMAL
HDLC SERPL-MCNC: 36 MG/DL (ref 40–60)
HGB BLD-MCNC: 14.8 G/DL (ref 12–15.9)
LDLC SERPL CALC-MCNC: 136 MG/DL (ref 0–100)
LDLC/HDLC SERPL: 3.78 {RATIO}
LYMPHOCYTES # BLD MANUAL: 3.72 10*3/MM3 (ref 0.7–3.1)
LYMPHOCYTES NFR BLD MANUAL: 18.2 % (ref 5–12)
LYMPHOCYTES NFR BLD MANUAL: 70.7 % (ref 19.6–45.3)
MCH RBC QN AUTO: 27.9 PG (ref 26.6–33)
MCHC RBC AUTO-ENTMCNC: 32.5 G/DL (ref 31.5–35.7)
MCV RBC AUTO: 85.8 FL (ref 79–97)
MICROALBUMIN/CREAT UR: NORMAL MG/G{CREAT}
MONOCYTES # BLD AUTO: 0.96 10*3/MM3 (ref 0.1–0.9)
NEUTROPHILS # BLD AUTO: 0.32 10*3/MM3 (ref 1.7–7)
NEUTROPHILS NFR BLD MANUAL: 6.1 % (ref 42.7–76)
PLAT MORPH BLD: NORMAL
PLATELET # BLD AUTO: 338 10*3/MM3 (ref 140–450)
PMV BLD AUTO: 11.2 FL (ref 6–12)
POTASSIUM BLD-SCNC: 5 MMOL/L (ref 3.5–5.2)
PROT SERPL-MCNC: 7.9 G/DL (ref 6–8.5)
RBC # BLD AUTO: 5.3 10*6/MM3 (ref 3.77–5.28)
RBC MORPH BLD: NORMAL
SODIUM BLD-SCNC: 135 MMOL/L (ref 136–145)
TRIGL SERPL-MCNC: 265 MG/DL (ref 0–150)
TSH SERPL DL<=0.05 MIU/L-ACNC: 2.71 UIU/ML (ref 0.27–4.2)
VLDLC SERPL-MCNC: 53 MG/DL (ref 5–40)
WBC MORPH BLD: NORMAL
WBC NRBC COR # BLD: 5.26 10*3/MM3 (ref 3.4–10.8)

## 2019-10-03 ENCOUNTER — TELEPHONE (OUTPATIENT)
Dept: FAMILY MEDICINE CLINIC | Facility: CLINIC | Age: 74
End: 2019-10-03

## 2019-10-03 NOTE — TELEPHONE ENCOUNTER
I spoke with Pt she stated that she can't take Cholesterol medication it makes her legs weak. Pt also stated that she will not be coming back. She saw another Doctor this morning that write her an Rx for Gabapentin.

## 2019-10-10 NOTE — PROGRESS NOTES
Subjective   Kelly Alvarez is a 74 y.o. female.     She presents today to establish care.  She was a patient of Marissa Morales at [facility].  She has a history of hypertension, diabetes, chronic pain, and heartburn.  She does see pain management in [place].  She would like a refill on her gabapentin.  She reports that she takes 1200 mg of gabapentin at bedtime.  She will be running out of this soon.  She reports that she did discuss with pain management this medication, however, they did want her to continue to receive it from primary care.  She is unsure of her routine fasting labs are up-to-date.  She does need a refill on her metformin today in the office.  She would also like to receive her flu shot.  She is otherwise without any other new complaints.      Hypertension   This is a chronic problem. The current episode started more than 1 year ago. The problem has been waxing and waning since onset. Associated symptoms include malaise/fatigue and neck pain. Pertinent negatives include no anxiety, blurred vision, chest pain, headaches, orthopnea, palpitations, peripheral edema, PND, shortness of breath or sweats. Risk factors for coronary artery disease include family history, obesity, post-menopausal state, sedentary lifestyle, stress and smoking/tobacco exposure. Past treatments include diuretics and central alpha agonists. Current antihypertension treatment includes diuretics and central alpha agonists. The current treatment provides significant improvement. Compliance problems include exercise and diet.    Diabetes   She presents for her follow-up diabetic visit. She has type 2 diabetes mellitus. Her disease course has been stable. There are no hypoglycemic associated symptoms. Pertinent negatives for hypoglycemia include no headaches or sweats. Associated symptoms include fatigue. Pertinent negatives for diabetes include no blurred vision, no chest pain, no foot paresthesias, no foot  ulcerations, no polydipsia, no polyphagia, no polyuria, no visual change, no weakness and no weight loss. There are no hypoglycemic complications. Symptoms are stable. There are no diabetic complications. Risk factors for coronary artery disease include diabetes mellitus, family history, hypertension, obesity, post-menopausal, sedentary lifestyle, stress and tobacco exposure. Current diabetic treatment includes oral agent (monotherapy). She is compliant with treatment most of the time. Her weight is stable. She is following a diabetic diet. She has not had a previous visit with a dietitian. She never participates in exercise. An ACE inhibitor/angiotensin II receptor blocker is not being taken.   Obesity   This is a chronic problem. The current episode started more than 1 year ago. The problem occurs constantly. The problem has been unchanged. Associated symptoms include arthralgias, fatigue, myalgias and neck pain. Pertinent negatives include no abdominal pain, anorexia, change in bowel habit, chest pain, chills, congestion, coughing, diaphoresis, fever, headaches, joint swelling, nausea, numbness, rash, sore throat, swollen glands, urinary symptoms, vertigo, visual change, vomiting or weakness. The symptoms are aggravated by drinking and eating. The treatment provided no relief.        The following portions of the patient's history were reviewed and updated as appropriate: allergies, current medications, past family history, past medical history, past social history, past surgical history and problem list.    Review of Systems   Constitutional: Positive for fatigue and malaise/fatigue. Negative for chills, diaphoresis, fever and unexpected weight loss.   HENT: Negative.  Negative for congestion, sore throat and swollen glands.    Eyes: Negative.  Negative for blurred vision.   Respiratory: Negative.  Negative for cough and shortness of breath.    Cardiovascular: Negative.  Negative for chest pain, palpitations,  orthopnea and PND.   Gastrointestinal: Negative.  Negative for abdominal pain, anorexia, change in bowel habit, nausea and vomiting.   Endocrine: Negative for polydipsia, polyphagia and polyuria.   Musculoskeletal: Positive for arthralgias, back pain, myalgias, neck pain and neck stiffness. Negative for joint swelling.   Skin: Negative.  Negative for rash.   Allergic/Immunologic: Negative.    Neurological: Negative.  Negative for vertigo, weakness and numbness.   Hematological: Negative.    Psychiatric/Behavioral: Negative.        Objective   Physical Exam   Constitutional: She is oriented to person, place, and time. Vital signs are normal. She appears well-developed and well-nourished. No distress. She is obese.  HENT:   Head: Normocephalic.   Right Ear: External ear normal.   Left Ear: External ear normal.   Nose: Nose normal.   Mouth/Throat: Oropharynx is clear and moist. No oropharyngeal exudate.   Eyes: Conjunctivae and EOM are normal. Pupils are equal, round, and reactive to light. Right eye exhibits no discharge. Left eye exhibits no discharge.   Neck: Normal range of motion. Neck supple. No tracheal deviation present. No thyromegaly present.   Cardiovascular: Normal rate, regular rhythm and normal heart sounds. Exam reveals no gallop and no friction rub.   No murmur heard.  Pulmonary/Chest: Effort normal and breath sounds normal. No respiratory distress. She has no wheezes. She has no rales. She exhibits no tenderness.   Musculoskeletal: Normal range of motion.   Lymphadenopathy:     She has no cervical adenopathy.   Neurological: She is alert and oriented to person, place, and time.   Skin: Skin is warm and dry. Capillary refill takes less than 2 seconds. No rash noted. She is not diaphoretic. No erythema. No pallor.   Psychiatric: She has a normal mood and affect. Her behavior is normal. Judgment and thought content normal.   Nursing note and vitals reviewed.        Assessment/Plan   Kelly was seen today  for establish care.    Diagnoses and all orders for this visit:    Needs flu shot  -     Fluarix/Fluzone/Afluria/FluLaval ([id number])  -     CBC & Differential  -     Comprehensive Metabolic Panel  -     Hemoglobin A1c  -     Lipid Panel  -     TSH  -     Vitamin D 25 Hydroxy  -     Microalbumin / Creatinine Urine Ratio - Urine, Clean Catch  -     Hepatitis C antibody  -     CBC Auto Differential  -     Manual Differential; Future  -     Manual Differential    Well adult exam  -     CBC & Differential  -     Comprehensive Metabolic Panel  -     Hemoglobin A1c  -     Lipid Panel  -     TSH  -     Vitamin D 25 Hydroxy  -     Microalbumin / Creatinine Urine Ratio - Urine, Clean Catch  -     Hepatitis C antibody  -     CBC Auto Differential  -     Manual Differential; Future  -     Manual Differential    Screening for diabetes mellitus  -     CBC & Differential  -     Comprehensive Metabolic Panel  -     Hemoglobin A1c  -     Lipid Panel  -     TSH  -     Vitamin D 25 Hydroxy  -     Microalbumin / Creatinine Urine Ratio - Urine, Clean Catch  -     Hepatitis C antibody  -     CBC Auto Differential  -     Manual Differential; Future  -     Manual Differential    Screening for lipoid disorders  -     CBC & Differential  -     Comprehensive Metabolic Panel  -     Hemoglobin A1c  -     Lipid Panel  -     TSH  -     Vitamin D 25 Hydroxy  -     Microalbumin / Creatinine Urine Ratio - Urine, Clean Catch  -     Hepatitis C antibody  -     CBC Auto Differential  -     Manual Differential; Future  -     Manual Differential    Type 2 diabetes mellitus with hyperglycemia, without long-term current use of insulin (CMS/HCC)  -     CBC & Differential  -     Comprehensive Metabolic Panel  -     Hemoglobin A1c  -     Lipid Panel  -     TSH  -     Vitamin D 25 Hydroxy  -     Microalbumin / Creatinine Urine Ratio - Urine, Clean Catch  -     Hepatitis C antibody  -     CBC Auto Differential  -     Manual Differential; Future  -     Manual  Differential    Essential hypertension  -     CBC & Differential  -     Comprehensive Metabolic Panel  -     Hemoglobin A1c  -     Lipid Panel  -     TSH  -     Vitamin D 25 Hydroxy  -     Microalbumin / Creatinine Urine Ratio - Urine, Clean Catch  -     Hepatitis C antibody  -     CBC Auto Differential  -     Manual Differential; Future  -     Manual Differential    Screening for thyroid disorder  -     CBC & Differential  -     Comprehensive Metabolic Panel  -     Hemoglobin A1c  -     Lipid Panel  -     TSH  -     Vitamin D 25 Hydroxy  -     Microalbumin / Creatinine Urine Ratio - Urine, Clean Catch  -     Hepatitis C antibody  -     CBC Auto Differential  -     Manual Differential; Future  -     Manual Differential    Encounter for hepatitis C screening test for low risk patient  -     Hepatitis C antibody    Disorder of cartilage   -     Vitamin D 25 Hydroxy               Patient's Body mass index is 38.45 kg/m². BMI is above normal parameters. Recommendations include: educational material.    Flu shot given IM in the office.  Fasting labs.  Encouraged her to talk with pain management about prescribing her gabapentin.  Continue current medications.  Follow up in 3 months for routine follow up.  Follow up sooner for problems/concerns.  Patient verbalized understanding and agreement with plan of care.        This document has been electronically signed by MARGARITO Langley on October 10, 2019 11:23 AM

## 2019-11-11 ENCOUNTER — OFFICE VISIT (OUTPATIENT)
Dept: CARDIOLOGY | Facility: CLINIC | Age: 74
End: 2019-11-11

## 2019-11-11 VITALS
WEIGHT: 238.9 LBS | DIASTOLIC BLOOD PRESSURE: 74 MMHG | BODY MASS INDEX: 38.39 KG/M2 | HEIGHT: 66 IN | HEART RATE: 94 BPM | OXYGEN SATURATION: 96 % | SYSTOLIC BLOOD PRESSURE: 154 MMHG

## 2019-11-11 DIAGNOSIS — R94.31 ABNORMAL EKG: ICD-10-CM

## 2019-11-11 DIAGNOSIS — E66.01 CLASS 2 SEVERE OBESITY DUE TO EXCESS CALORIES WITH SERIOUS COMORBIDITY AND BODY MASS INDEX (BMI) OF 38.0 TO 38.9 IN ADULT (HCC): ICD-10-CM

## 2019-11-11 DIAGNOSIS — R07.2 PRECORDIAL PAIN: Primary | ICD-10-CM

## 2019-11-11 DIAGNOSIS — R94.31 ABNORMAL EKG: Primary | ICD-10-CM

## 2019-11-11 DIAGNOSIS — I10 ESSENTIAL HYPERTENSION: ICD-10-CM

## 2019-11-11 DIAGNOSIS — I71.40 AAA (ABDOMINAL AORTIC ANEURYSM) WITHOUT RUPTURE (HCC): ICD-10-CM

## 2019-11-11 PROCEDURE — 93000 ELECTROCARDIOGRAM COMPLETE: CPT | Performed by: INTERNAL MEDICINE

## 2019-11-11 PROCEDURE — 99204 OFFICE O/P NEW MOD 45 MIN: CPT | Performed by: INTERNAL MEDICINE

## 2019-11-11 RX ORDER — MECLIZINE HYDROCHLORIDE 25 MG/1
TABLET ORAL
COMMUNITY

## 2019-11-11 RX ORDER — VITAMIN B COMPLEX
CAPSULE ORAL DAILY
COMMUNITY

## 2019-11-11 RX ORDER — LOSARTAN POTASSIUM 25 MG/1
TABLET ORAL DAILY
COMMUNITY
Start: 2019-10-03

## 2019-11-11 NOTE — PROGRESS NOTES
"   Cardiovascular Medicine      Sunil Lee M.D., Ph.D., [facility]         Thank you for asking me to see Kelly Alvarez for abnormal EKG.    History of Present Illness  This is a 74 y.o. female with:    1.  Abnormal EKG  2. TAA  3.  Risk factors: Former smoker, obese, type 2 diabetes, HTN    Kelly Alvarez is a 74 y.o. female who presents for consultation today.  The patient's referred for cardiovascular evaluation.  Patient recently had an asymptomatic EKG obtained during a wellness visit.  EKG was interpreted as abnormal.  The EKG demonstrated PRWP with the inability to exclude a prior anterior infarction.  Patient denies history of MI, CHF or CAD. .  She is a former smoker.  She is obese with a BMI of 38.4.  She does have dyslipidemia and hypertension.  She currently presents with high risk of ASCVD with an elevated 10-year risk of ASCVD calculated today. She does have some CP. This is non-exertional. This is random. It starts with her chest and sometimes even starts in her neck. She uses ASA when she has neck pain and this dissipates. She does have DM2. She is now on Red Yeast Rice. She also uses fish oil. She thinks she was told several years ago she had a MI based on an EKG. She is sedentary. She has had testing in the past. She saw Dr. Bean several years ago. She was in need of HBOT. She thinks she had a MPS, but she isn't certain. She did a lifeline screening two years ago and it was \"OK.\" She was told she had an aortic aneurysm, but she isn't certain where it was located. She thinks this was a sonogram.     Review of Systems - Review of Systems   Cardiovascular: Positive for chest pain. Negative for claudication, cyanosis, dyspnea on exertion, irregular heartbeat, leg swelling, near-syncope, orthopnea, palpitations, paroxysmal nocturnal dyspnea and syncope.   Respiratory: Negative.         All other systems were reviewed and were negative.    family history includes Alzheimer's disease " in her mother; Heart failure in her father.     reports that she has quit smoking. She has never used smokeless tobacco. She reports that she does not drink alcohol or use drugs.    Allergies   Allergen Reactions   • Ceftriaxone GI Intolerance     vomiting   • Codeine Other (See Comments)     Clammy   • Diazepam Irritability     Muscle weakness   • Levaquin [Levofloxacin] Irritability   • Morphine Irritability     clammy   • Zanaflex [Tizanidine Hcl] Other (See Comments)     Patient cannot remeber type of reaction.         Current Outpatient Medications:   •  ACCU-CHEK SOFTCLIX LANCETS lancets, Accu-Chek Softclix Lancets, Disp: , Rfl:   •  amitriptyline (ELAVIL) 50 MG tablet, Take 50 mg by mouth every night., Disp: , Rfl:   •  aspirin 81 MG chewable tablet, Chew 81 mg daily., Disp: , Rfl:   •  cloNIDine (CATAPRES) 0.3 MG tablet, Take 0.3 mg by mouth 2 (two) times a day., Disp: , Rfl:   •  COENZYME Q-10 PO, Take  by mouth., Disp: , Rfl:   •  furosemide (LASIX) 40 MG tablet, Take 40 mg by mouth every morning., Disp: , Rfl:   •  gabapentin (NEURONTIN) 300 MG capsule, 300 mg Every Night. Patient stated she takes 4 at bedtime   Rx reads take 1 capsule in the morning 1 at lunch and 4 capsules at bedtime., Disp: , Rfl:   •  glucose blood test strip, Accu-Chek Maribell Plus Meter, Disp: , Rfl:   •  glucose blood test strip, Accu-Chek Maribell Plus test strips  USE TO TEST BLOOD GLUCOSE ONCE DAILY., Disp: , Rfl:   •  glucose blood test strip, Accu-Chek Maribell Plus test strips  USE TO TEST BLOOD GLUCOSE Twice DAILY., Disp: , Rfl:   •  glucose blood test strip, Accu-Chek Maribell Plus test strips, Disp: , Rfl:   •  HYDROcodone-acetaminophen (NORCO)  MG per tablet, Take 1 tablet by mouth 4 (Four) Times a Day., Disp: 120 tablet, Rfl: 0  •  meclizine (ANTIVERT) 25 MG tablet, meclizine 25 mg tablet  Take 1 tablet 3 times a day by oral route for 30 days., Disp: , Rfl:   •  metFORMIN (GLUCOPHAGE) 1000 MG tablet, 1,000 mg 2 (Two) Times a  Day., Disp: , Rfl:   •  nystatin-triamcinolone (MYCOLOG II) 908336-8.1 UNIT/GM-% cream, , Disp: , Rfl:   •  omeprazole (priLOSEC) 20 MG capsule, , Disp: , Rfl:     Physical Exam:  There were no vitals filed for this visit.  Pulse Ox: Normal  on room air  General: alert, appears stated age and cooperative     Body Habitus: obese    HEENT: Head: Normocephalic, no lesions, without obvious abnormality. No arcus senilis, xanthelasma or xanthomas.    Neuro: alert, oriented x3  speech normal in context and clarity  memory intact grossly  Pulses: 2+ and symmetric  JVP: Volume/Pulsation: Normal.  Normal waveforms.   Appropriate inspiratory decrease.  No Kussmaul's. No Tea's.   Carotid Exam: no bruit normal pulsation bilaterally   Carotid Volume: normal.     Subclavian Bruit: absent  Vertebral Bruit: absent  Respirations: no increased work of breathing   Chest:  Normal    Pulmonary:Normal     Precordium: Normal impulses. P2 is not palpable.  RV Heave: absent  LV Heave: absent  Mims:  normal size and placement  Palpable S4: absent.  Heart rate: normal    Heart Rhythm: regular     Heart Sounds: S1: normal intensity  S2: normal intensity  S3: absent   S4: absent  Opening Snap: absent    A2-OS:  no  Pericardial Rub:  Absent  Ejection click: None      Murmurs:  absent   Extremity: moves all extremities equally.       DATA REVIEWED:     EKG. I personally reviewed and interpreted the EKG.            --------------------------------------------------------------------------------------------------  LABS:     The 10-year CVD risk score (Sabi et al., 2008) is: 45.7%    Values used to calculate the score:      Age: 74 years      Sex: Female      Diabetic: Yes      Tobacco smoker: No      Systolic Blood Pressure: 140 mmHg      Is BP treated: Yes      HDL Cholesterol: 36 mg/dL      Total Cholesterol: 225 mg/dL         Lab Results   Component Value Date    GLUCOSE 100 (H) 09/24/2019    BUN 19 09/24/2019    CREATININE 0.84  09/24/2019    EGFRIFNONA 66 09/24/2019    BCR 22.6 09/24/2019    K 5.0 09/24/2019    CO2 29.0 09/24/2019    CALCIUM 9.6 09/24/2019    ALBUMIN 4.60 09/24/2019    AST 25 09/24/2019    ALT 17 09/24/2019     Lab Results   Component Value Date    WBC 5.26 09/24/2019    HGB 14.8 09/24/2019    HCT 45.5 09/24/2019    MCV 85.8 09/24/2019     09/24/2019     Lab Results   Component Value Date    CHOL 225 (H) 09/24/2019    TRIG 265 (H) 09/24/2019    HDL 36 (L) 09/24/2019     (H) 09/24/2019     Lab Results   Component Value Date    TSH 2.710 09/24/2019     Lab Results   Component Value Date    CKTOTAL 146 (H) 12/05/2016    CKMB 1.6 12/05/2016    TROPONINI <0.012 12/05/2016     Lab Results   Component Value Date    HGBA1C 6.70 (H) 09/24/2019     No results found for: DDIMER  Lab Results   Component Value Date    ALT 17 09/24/2019     Lab Results   Component Value Date    HGBA1C 6.70 (H) 09/24/2019    HGBA1C 7.0 (H) 11/21/2016     Lab Results   Component Value Date    MICROALBUR <1.2 09/24/2019    CREATININE 0.84 09/24/2019     No results found for: IRON, TIBC, FERRITIN  Lab Results   Component Value Date    INR 1.40 (H) 12/15/2016    INR 2.3 (H) 12/05/2016    INR 2.4 (H) 12/01/2016    PROTIME 24.8 (H) 12/05/2016    PROTIME 25.5 (H) 12/01/2016    PROTIME 26.8 (H) 11/30/2016       Assessment/Plan     1. CPS and Abnormal EKG. the patient is high-risk for obstructive coronary artery disease.  EKG was reviewed.  There is PRWP, unable to exclude infarction.  However, this also may be related to her body habitus.  I recommended a 2D echocardiogram.Her repeat EKG here was also consistent with possible prior septal infarction.  She does warrant ischemia evaluation.  She ambulates with a cane so she will need to proceed with a myocardial perfusion scintigraphy.  -TTE and MPS    2. Cardiac Risk Assessments based on 2019 ACCF guidelines:  · A team-based care approach is recommended for the control of risk factors associated  with ASCAD.  As such, Kelly Alvarez was requested to have ongoing follow-up with their PCP.  · For adults 20 to 39 years of age it is reasonable to assess traditional risk factors every 4 to 6 years; for adults 40 to 75 years of age, it is recommended to assess ASCVD risk which was calculated as The 10-year CVD risk score (DELONTE'Manuelino, et al., 2008) is: 45.7%  ·   Values used to calculate the score:  ·     Age: 74 years  ·     Sex: Female  ·     Diabetic: Yes  ·     Tobacco smoker: No  ·     Systolic Blood Pressure: 140 mmHg  ·     Is BP treated: Yes  ·     HDL Cholesterol: 36 mg/dL  ·     Total Cholesterol: 225 mg/dL.   · DISCUSS with PCP about statin initiation. She refuses for now.   · Continue follow-up visits with PCP for monitoring of labs; diet emphasizing intake of vegetables, fruits, nuts, whole grains and fish is recommended.  · Physical activity recommendations were provided.  · Essential HTN is a significant risk factor for stroke, heart disease and vascular disease. I've recommended the patient continue current medications, if any, as prescribed by the primary care provider. I recommended they have close follow-up for ongoing mgmt of this and the medical comorbidities associated with HTN with their PCP.  They were also provided with information regarding maintaining a healthy weight, heart-healthy dietary pattern DASH information.  Goal blood pressure less than 130/80.   · The patient's BMI is recommended to be calculated at least annually.  The patient's BMI is Body mass index is 39.15 kg/m²..  This places the patient in weight class:  Obese Class II: 35-39.9kg/m2.   Hand out, increase aerobic activity  cut out extra servings, decrease soda or juice intake, eat breakfast, eat more fruits and vegetables, family to eat at dinner table more often, have 3 meals a day, increase physical activity, increase water intake, keep TV off during meals, plan meals, reduce fast food intake, reduce portion size  and reduce screen time; close PCP follow-up.  · Tobacco status assessed at every visits.  The patient's nicotine status: is a former smoker  -Congratulated on cessation  -Avoid tobacco    3. AAA of unknown diameter.  -Abdominal aorta Duplex       Return in about 4 weeks (around 12/9/2019).

## 2019-11-11 NOTE — PATIENT INSTRUCTIONS
Preventing Unhealthy Weight Gain, Adult  Staying at a healthy weight is important to your overall health. When fat builds up in your body, you may become overweight or obese. Being overweight or obese increases your risk of developing certain health problems, such as heart disease, diabetes, sleeping problems, joint problems, and some types of cancer.  Unhealthy weight gain is often the result of making unhealthy food choices or not getting enough exercise. You can make changes to your lifestyle to prevent obesity and stay as healthy as possible.  What nutrition changes can be made?    · Eat only as much as your body needs. To do this:  ? Pay attention to signs that you are hungry or full. Stop eating as soon as you feel full.  ? If you feel hungry, try drinking water first before eating. Drink enough water so your urine is clear or pale yellow.  ? Eat smaller portions. Pay attention to portion sizes when eating out.  ? Look at serving sizes on food labels. Most foods contain more than one serving per container.  ? Eat the recommended number of calories for your gender and activity level. For most active people, a daily total of 2,000 calories is appropriate. If you are trying to lose weight or are not very active, you may need to eat fewer calories. Talk with your health care provider or a diet and nutrition specialist (dietitian) about how many calories you need each day.  · Choose healthy foods, such as:  ? Fruits and vegetables. At each meal, try to fill at least half of your plate with fruits and vegetables.  ? Whole grains, such as whole-wheat bread, brown rice, and quinoa.  ? Lean meats, such as chicken or fish.  ? Other healthy proteins, such as beans, eggs, or tofu.  ? Healthy fats, such as nuts, seeds, fatty fish, and olive oil.  ? Low-fat or fat-free dairy products.  · Check food labels, and avoid food and drinks that:  ? Are high in calories.  ? Have added sugar.  ? Are high in sodium.  ? Have saturated  fats or trans fats.  · Cook foods in healthier ways, such as by baking, broiling, or grilling.  · Make a meal plan for the week, and shop with a grocery list to help you stay on track with your purchases. Try to avoid going to the grocery store when you are hungry.  · When grocery shopping, try to shop around the outside of the store first, where the fresh foods are. Doing this helps you to avoid prepackaged foods, which can be high in sugar, salt (sodium), and fat.  What lifestyle changes can be made?    · Exercise for 30 or more minutes on 5 or more days each week. Exercising may include brisk walking, yard work, biking, running, swimming, and team sports like basketball and soccer. Ask your health care provider which exercises are safe for you.  · Do muscle-strengthening activities, such as lifting weights or using resistance bands, on 2 or more days a week.  · Do not use any products that contain nicotine or tobacco, such as cigarettes and e-cigarettes. If you need help quitting, ask your health care provider.  · Limit alcohol intake to no more than 1 drink a day for nonpregnant women and 2 drinks a day for men. One drink equals 12 oz of beer, 5 oz of wine, or 1½ oz of hard liquor.  · Try to get 7-9 hours of sleep each night.  What other changes can be made?  · Keep a food and activity journal to keep track of:  ? What you ate and how many calories you had. Remember to count the calories in sauces, dressings, and side dishes.  ? Whether you were active, and what exercises you did.  ? Your calorie, weight, and activity goals.  · Check your weight regularly. Track any changes. If you notice you have gained weight, make changes to your diet or activity routine.  · Avoid taking weight-loss medicines or supplements. Talk to your health care provider before starting any new medicine or supplement.  · Talk to your health care provider before trying any new diet or exercise plan.  Why are these changes  important?  Eating healthy, staying active, and having healthy habits can help you to prevent obesity. Those changes also:  · Help you manage stress and emotions.  · Help you connect with friends and family.  · Improve your self-esteem.  · Improve your sleep.  · Prevent long-term health problems.  What can happen if changes are not made?  Being obese or overweight can cause you to develop joint or bone problems, which can make it hard for you to stay active or do activities you enjoy. Being obese or overweight also puts stress on your heart and lungs and can lead to health problems like diabetes, heart disease, and some cancers.  Where to find more information  Talk with your health care provider or a dietitian about healthy eating and healthy lifestyle choices. You may also find information from:  · U.[place]. Department of Agriculture, MyPlate: www.Ranch Networksmyplate.gov  · American Heart Association: www.heart.org  · Centers for Disease Control and Prevention: www.cdc.gov  Summary  · Staying at a healthy weight is important to your overall health. It helps you to prevent certain diseases and health problems, such as heart disease, diabetes, joint problems, sleep disorders, and some types of cancer.  · Being obese or overweight can cause you to develop joint or bone problems, which can make it hard for you to stay active or do activities you enjoy.  · You can prevent unhealthy weight gain by eating a healthy diet, exercising regularly, not smoking, limiting alcohol, and getting enough sleep.  · Talk with your health care provider or a dietitian for guidance about healthy eating and healthy lifestyle choices.  This information is not intended to replace advice given to you by your health care provider. Make sure you discuss any questions you have with your health care provider.  Document Released: 12/19/2017 Document Revised: 09/28/2018 Document Reviewed: 01/24/2018  [employer] Interactive Patient Education © 2019 [employer]  Echocardiogram  An echocardiogram is a procedure that uses painless sound waves (ultrasound) to produce an image of the heart. Images from an echocardiogram can provide important information about:  · Signs of coronary artery disease (CAD).  · Aneurysm detection. An aneurysm is a weak or damaged part of an artery wall that bulges out from the normal force of blood pumping through the body.  · Heart size and shape. Changes in the size or shape of the heart can be associated with certain conditions, including heart failure, aneurysm, and CAD.  · Heart muscle function.  · Heart valve function.  · Signs of a past heart attack.  · Fluid buildup around the heart.  · Thickening of the heart muscle.  · A tumor or infectious growth around the heart valves.  Tell a health care provider about:  · Any allergies you have.  · All medicines you are taking, including vitamins, herbs, eye drops, creams, and over-the-counter medicines.  · Any blood disorders you have.  · Any surgeries you have had.  · Any medical conditions you have.  · Whether you are pregnant or may be pregnant.  What are the risks?  Generally, this is a safe procedure. However, problems may occur, including:  · Allergic reaction to dye (contrast) that may be used during the procedure.  What happens before the procedure?  No specific preparation is needed. You may eat and drink normally.  What happens during the procedure?    · An IV tube may be inserted into one of your veins.  · You may receive contrast through this tube. A contrast is an injection that improves the quality of the pictures from your heart.  · A gel will be applied to your chest.  · A wand-like tool (transducer) will be moved over your chest. The gel will help to transmit the sound waves from the transducer.  · The sound waves will harmlessly bounce off of your heart to allow the heart images to be captured in real-time motion. The images will be recorded on a computer.  The procedure may  vary among health care providers and hospitals.  What happens after the procedure?  · You may return to your normal, everyday life, including diet, activities, and medicines, unless your health care provider tells you not to do that.  Summary  · An echocardiogram is a procedure that uses painless sound waves (ultrasound) to produce an image of the heart.  · Images from an echocardiogram can provide important information about the size and shape of your heart, heart muscle function, heart valve function, and fluid buildup around your heart.  · You do not need to do anything to prepare before this procedure. You may eat and drink normally.  · After the echocardiogram is completed, you may return to your normal, everyday life, unless your health care provider tells you not to do that.  This information is not intended to replace advice given to you by your health care provider. Make sure you discuss any questions you have with your health care provider.  Document Released: 12/15/2001 Document Revised: 01/20/2018 Document Reviewed: 01/20/2018  [employer] Interactive Patient Education © 2019 [employer]    MUGA Scan    A MUGA scan (multigated acquisition scan) is a test that creates images of the heart at specific times while it beats. The test involves having a radioactive dye (tracer) injected into the bloodstream. The tracer lets the health care provider see red blood cells passing through the heart during the scan. The test may be done while you are resting or while you are exercising. Having this test done while exercising can help determine if changes occur when your heart is under stress.  A MUGA scan shows:  · How much blood the heart is pumping out with each heartbeat.  · How well the chambers in the lower part of the heart (the ventricles) are working. Ventricles pump blood to the lungs and the rest of the body.  A MUGA scan may be done to determine:  · What is causing heart symptoms, such as chest pain.  · If  your heart has been damaged by a heart attack or chemotherapy.  · If your heart has trouble pumping blood.  Tell a health care provider about:  · Any allergies you have.  · All medicines you are taking, including vitamins, herbs, eye drops, creams, and over-the-counter medicines.  · Any blood disorders you have.  · Any surgeries you have had.  · Any medical conditions you have.  · Whether you are pregnant, may be pregnant, or are breastfeeding. This is very important.  What are the risks?  Generally, this is a safe procedure. However, problems may occur, including:  · An allergic reaction to the tracer. This is rare.  · Pain and redness at the IV site.  · Possible harm to your baby if you are pregnant or breastfeeding.  · Being exposed to too much radiation over a lifetime can increase the risk of cancer. This risk is small, but it may occur if you have many exposures throughout your life.  What happens before the procedure?  · Ask your health care provider about changing or stopping your regular medicines. This is especially important if you are taking diabetes medicines or blood thinners.  · Ask your health care provider if you will be having an exercise scan with your MUGA scan.  · Follow instructions from your health care provider about eating or drinking restrictions.  ? Starting 4 hours before your scan, do not drink any beverages that contain caffeine, including coffee, tea, and soda.  ? If you will be having an exercise scan with your MUGA scan, do not eat or drink anything except water starting 4 hours before your scan.  · Wear loose, comfortable clothing.  What happens during the procedure?  · You will be asked to lie down on an exam table.  · An IV will be inserted into one of your veins.  · Sticky pads (electrodes) will be placed on your chest, arms, and legs.  · Wires will be placed on the electrodes to connect them to a machine.  · A small amount of tracer will be injected through your IV. You may feel  a cold sensation in your arm as the tracer enters your bloodstream.  · A camera will be placed over your chest. It will take a series of pictures while you lie still.  · If you are also having an exercise scan, you will walk on a treadmill or ride a stationary bicycle. Then you will be asked to lie back down on the exam table for more pictures.  · After all of the pictures have been taken, the electrodes and IV will be removed.  The procedure may vary among health care providers and hospitals.  What happens after the procedure?  · You will need to drink enough fluid to keep your urine pale yellow. This helps to flush the tracer out of your body.  · It is up to you to get your test results. Ask your health care provider, or the department that is doing the test, when your results will be ready.  Summary  · A MUGA scan (multigated acquisition scan) is a test that creates images of your heart at specific times while it beats. The scan shows how well your heart is pumping blood to the rest of your body.  · A number of different conditions can lead to your heart having a decreased pumping ability.  · It is up to you to get your test results. Ask your health care provider, or the department that is doing the test, when your results will be ready.  This information is not intended to replace advice given to you by your health care provider. Make sure you discuss any questions you have with your health care provider.  Document Released: 02/06/2007 Document Revised: 09/20/2018 Document Reviewed: 09/20/2018  [employer] Interactive Patient Education © 2019 [employer]

## 2019-11-21 ENCOUNTER — APPOINTMENT (OUTPATIENT)
Dept: NUCLEAR MEDICINE | Facility: HOSPITAL | Age: 74
End: 2019-11-21

## 2020-01-14 ENCOUNTER — HOSPITAL ENCOUNTER (OUTPATIENT)
Dept: NUCLEAR MEDICINE | Facility: HOSPITAL | Age: 75
Discharge: HOME | End: 2020-01-14

## 2020-01-14 PROCEDURE — 78452 HT MUSCLE IMAGE SPECT MULT: CPT

## 2020-01-14 PROCEDURE — 93017 CV STRESS TEST TRACING ONLY: CPT

## 2020-01-14 PROCEDURE — A9500 TC99M SESTAMIBI: HCPCS | Performed by: INTERNAL MEDICINE

## 2020-01-14 PROCEDURE — 0 TECHNETIUM SESTAMIBI: Performed by: INTERNAL MEDICINE

## 2020-01-14 RX ADMIN — TECHNETIUM TC 99M SESTAMIBI 1 DOSE: 1 INJECTION INTRAVENOUS at 07:03

## 2020-01-15 ENCOUNTER — HOSPITAL ENCOUNTER (OUTPATIENT)
Dept: NUCLEAR MEDICINE | Facility: HOSPITAL | Age: 75
Discharge: HOME | End: 2020-01-15

## 2020-01-15 ENCOUNTER — HOSPITAL ENCOUNTER (OUTPATIENT)
Dept: CARDIOLOGY | Facility: HOSPITAL | Age: 75
Discharge: HOME | End: 2020-01-15

## 2020-01-15 LAB
BH CV STRESS BP STAGE 1: NORMAL
BH CV STRESS COMMENTS STAGE 1: NORMAL
BH CV STRESS DOSE REGADENOSON STAGE 1: 0.4
BH CV STRESS DURATION MIN STAGE 1: 0
BH CV STRESS DURATION SEC STAGE 1: 10
BH CV STRESS HR STAGE 1: 97
BH CV STRESS PROTOCOL 1: NORMAL
BH CV STRESS RECOVERY BP: NORMAL MMHG
BH CV STRESS RECOVERY HR: 83 BPM
BH CV STRESS STAGE 1: 1
LV EF NUC BP: 72 %
MAXIMAL PREDICTED HEART RATE: 146 BPM
PERCENT MAX PREDICTED HR: 68.49 %
STRESS BASELINE BP: NORMAL MMHG
STRESS BASELINE HR: 70 BPM
STRESS PERCENT HR: 81 %
STRESS POST ESTIMATED WORKLOAD: 1 METS
STRESS POST PEAK BP: NORMAL MMHG
STRESS POST PEAK HR: 100 BPM
STRESS TARGET HR: 124 BPM

## 2020-01-15 PROCEDURE — 93018 CV STRESS TEST I&R ONLY: CPT | Performed by: INTERNAL MEDICINE

## 2020-01-15 PROCEDURE — A9500 TC99M SESTAMIBI: HCPCS | Performed by: INTERNAL MEDICINE

## 2020-01-15 PROCEDURE — 25010000002 REGADENOSON 0.4 MG/5ML SOLUTION: Performed by: INTERNAL MEDICINE

## 2020-01-15 PROCEDURE — 78452 HT MUSCLE IMAGE SPECT MULT: CPT | Performed by: INTERNAL MEDICINE

## 2020-01-15 PROCEDURE — 0 TECHNETIUM SESTAMIBI: Performed by: INTERNAL MEDICINE

## 2020-01-15 PROCEDURE — 93016 CV STRESS TEST SUPVJ ONLY: CPT | Performed by: INTERNAL MEDICINE

## 2020-01-15 RX ORDER — SODIUM CHLORIDE 0.9 % (FLUSH) 0.9 %
10 SYRINGE (ML) INJECTION ONCE
Status: COMPLETED | OUTPATIENT
Start: 2020-01-15 | End: 2020-01-15

## 2020-01-15 RX ADMIN — REGADENOSON 0.4 MG: 0.08 INJECTION, SOLUTION INTRAVENOUS at 08:21

## 2020-01-15 RX ADMIN — TECHNETIUM TC 99M SESTAMIBI 1 DOSE: 1 INJECTION INTRAVENOUS at 08:21

## 2020-01-15 RX ADMIN — SODIUM CHLORIDE, PRESERVATIVE FREE 10 ML: 5 INJECTION INTRAVENOUS at 08:21

## 2020-01-17 ENCOUNTER — TELEPHONE (OUTPATIENT)
Dept: CARDIOLOGY | Facility: CLINIC | Age: 75
End: 2020-01-17

## 2020-01-17 DIAGNOSIS — I71.40 AAA (ABDOMINAL AORTIC ANEURYSM) WITHOUT RUPTURE (HCC): Primary | ICD-10-CM

## 2020-01-17 LAB
ABDOMINAL DIST AORTA AP: 3.84 CM
ABDOMINAL DIST AORTA TRANS: 3.84 CM
ABDOMINAL LT COM ILIAC AP: 1.23 CM
ABDOMINAL LT COM ILIAC TRANS: 1.07 CM
ABDOMINAL MID AORTA AP: 2.91 CM
ABDOMINAL MID AORTA TRANS: 2.35 CM
ABDOMINAL RT COM ILIAC AP: 1.14 CM
ABDOMINAL RT COM ILIAC TRANS: 1.12 CM
BH CV ECHO MEAS - ACS: 1.4 CM
BH CV ECHO MEAS - AO MAX PG (FULL): 4 MMHG
BH CV ECHO MEAS - AO MAX PG: 7.5 MMHG
BH CV ECHO MEAS - AO MEAN PG (FULL): 2 MMHG
BH CV ECHO MEAS - AO MEAN PG: 4 MMHG
BH CV ECHO MEAS - AO ROOT AREA (BSA CORRECTED): 1.5
BH CV ECHO MEAS - AO ROOT AREA: 8.6 CM^2
BH CV ECHO MEAS - AO ROOT DIAM: 3.3 CM
BH CV ECHO MEAS - AO V2 MAX: 137 CM/SEC
BH CV ECHO MEAS - AO V2 MEAN: 88 CM/SEC
BH CV ECHO MEAS - AO V2 VTI: 23.2 CM
BH CV ECHO MEAS - ASC AORTA: 3.6 CM
BH CV ECHO MEAS - AVA(I,A): 2.9 CM^2
BH CV ECHO MEAS - AVA(I,D): 2.9 CM^2
BH CV ECHO MEAS - AVA(V,A): 2.6 CM^2
BH CV ECHO MEAS - AVA(V,D): 2.6 CM^2
BH CV ECHO MEAS - BSA(HAYCOCK): 2.3 M^2
BH CV ECHO MEAS - BSA: 2.1 M^2
BH CV ECHO MEAS - BZI_BMI: 39.6 KILOGRAMS/M^2
BH CV ECHO MEAS - BZI_METRIC_HEIGHT: 165.1 CM
BH CV ECHO MEAS - BZI_METRIC_WEIGHT: 108 KG
BH CV ECHO MEAS - EDV(CUBED): 74.1 ML
BH CV ECHO MEAS - EDV(MOD-SP2): 66 ML
BH CV ECHO MEAS - EDV(MOD-SP4): 68 ML
BH CV ECHO MEAS - EDV(TEICH): 78.6 ML
BH CV ECHO MEAS - EF(CUBED): 70.4 %
BH CV ECHO MEAS - EF(MOD-BP): 69 %
BH CV ECHO MEAS - EF(MOD-SP2): 54.5 %
BH CV ECHO MEAS - EF(MOD-SP4): 69.1 %
BH CV ECHO MEAS - EF(TEICH): 62.4 %
BH CV ECHO MEAS - ESV(CUBED): 22 ML
BH CV ECHO MEAS - ESV(MOD-SP2): 30 ML
BH CV ECHO MEAS - ESV(MOD-SP4): 21 ML
BH CV ECHO MEAS - ESV(TEICH): 29.6 ML
BH CV ECHO MEAS - FS: 33.3 %
BH CV ECHO MEAS - IVS/LVPW: 1.1
BH CV ECHO MEAS - IVSD: 1 CM
BH CV ECHO MEAS - LA DIMENSION: 3.4 CM
BH CV ECHO MEAS - LA/AO: 1
BH CV ECHO MEAS - LV DIASTOLIC VOL/BSA (35-75): 31.9 ML/M^2
BH CV ECHO MEAS - LV MASS(C)D: 127.8 GRAMS
BH CV ECHO MEAS - LV MASS(C)DI: 60 GRAMS/M^2
BH CV ECHO MEAS - LV MAX PG: 3.5 MMHG
BH CV ECHO MEAS - LV MEAN PG: 2 MMHG
BH CV ECHO MEAS - LV SYSTOLIC VOL/BSA (12-30): 9.9 ML/M^2
BH CV ECHO MEAS - LV V1 MAX: 93.2 CM/SEC
BH CV ECHO MEAS - LV V1 MEAN: 59.8 CM/SEC
BH CV ECHO MEAS - LV V1 VTI: 17.5 CM
BH CV ECHO MEAS - LVIDD: 4.2 CM
BH CV ECHO MEAS - LVIDS: 2.8 CM
BH CV ECHO MEAS - LVLD AP2: 6.6 CM
BH CV ECHO MEAS - LVLD AP4: 7.5 CM
BH CV ECHO MEAS - LVLS AP2: 6.4 CM
BH CV ECHO MEAS - LVLS AP4: 6.4 CM
BH CV ECHO MEAS - LVOT AREA (M): 3.8 CM^2
BH CV ECHO MEAS - LVOT AREA: 3.8 CM^2
BH CV ECHO MEAS - LVOT DIAM: 2.2 CM
BH CV ECHO MEAS - LVPWD: 0.9 CM
BH CV ECHO MEAS - MV A MAX VEL: 94.3 CM/SEC
BH CV ECHO MEAS - MV DEC SLOPE: 212 CM/SEC^2
BH CV ECHO MEAS - MV E MAX VEL: 53.8 CM/SEC
BH CV ECHO MEAS - MV E/A: 0.57
BH CV ECHO MEAS - MV MAX PG: 4.1 MMHG
BH CV ECHO MEAS - MV MEAN PG: 1 MMHG
BH CV ECHO MEAS - MV P1/2T MAX VEL: 65.1 CM/SEC
BH CV ECHO MEAS - MV P1/2T: 89.9 MSEC
BH CV ECHO MEAS - MV V2 MAX: 101 CM/SEC
BH CV ECHO MEAS - MV V2 MEAN: 55.7 CM/SEC
BH CV ECHO MEAS - MV V2 VTI: 18 CM
BH CV ECHO MEAS - MVA P1/2T LCG: 3.4 CM^2
BH CV ECHO MEAS - MVA(P1/2T): 2.4 CM^2
BH CV ECHO MEAS - MVA(VTI): 3.7 CM^2
BH CV ECHO MEAS - PA MAX PG: 1.6 MMHG
BH CV ECHO MEAS - PA MEAN PG: 1 MMHG
BH CV ECHO MEAS - PA V2 MAX: 62.4 CM/SEC
BH CV ECHO MEAS - PA V2 MEAN: 39.1 CM/SEC
BH CV ECHO MEAS - PA V2 VTI: 12.4 CM
BH CV ECHO MEAS - SI(AO): 93.2 ML/M^2
BH CV ECHO MEAS - SI(CUBED): 24.5 ML/M^2
BH CV ECHO MEAS - SI(LVOT): 31.2 ML/M^2
BH CV ECHO MEAS - SI(MOD-SP2): 16.9 ML/M^2
BH CV ECHO MEAS - SI(MOD-SP4): 22.1 ML/M^2
BH CV ECHO MEAS - SI(TEICH): 23 ML/M^2
BH CV ECHO MEAS - SV(AO): 198.4 ML
BH CV ECHO MEAS - SV(CUBED): 52.1 ML
BH CV ECHO MEAS - SV(LVOT): 66.5 ML
BH CV ECHO MEAS - SV(MOD-SP2): 36 ML
BH CV ECHO MEAS - SV(MOD-SP4): 47 ML
BH CV ECHO MEAS - SV(TEICH): 49 ML
BH CV ECHO MEAS - TAPSE (>1.6): 1.6 CM2
BH CV ECHO MEAS - TR MAX VEL: 105 CM/SEC
BH CV VAS BP RIGHT ARM: NORMAL MMHG

## 2020-01-17 NOTE — TELEPHONE ENCOUNTER
Sunil Lee MD PhD  Kyra Burroughs, RN             Can you let her know that her abdominal aorta did measure larger than it should at 3.8 cm?  I placed a referral to vascular surgery with Constanza rose who will keep an eye on this for now.          Patient verbalized understanding

## 2020-01-23 ENCOUNTER — OFFICE VISIT (OUTPATIENT)
Dept: CARDIOLOGY | Facility: CLINIC | Age: 75
End: 2020-01-23

## 2020-01-23 VITALS
DIASTOLIC BLOOD PRESSURE: 84 MMHG | OXYGEN SATURATION: 96 % | HEIGHT: 66 IN | BODY MASS INDEX: 38.18 KG/M2 | WEIGHT: 237.6 LBS | SYSTOLIC BLOOD PRESSURE: 168 MMHG | HEART RATE: 95 BPM

## 2020-01-23 DIAGNOSIS — I10 ESSENTIAL HYPERTENSION: ICD-10-CM

## 2020-01-23 DIAGNOSIS — I35.1 NONRHEUMATIC AORTIC VALVE INSUFFICIENCY: Primary | ICD-10-CM

## 2020-01-23 DIAGNOSIS — I71.40 AAA (ABDOMINAL AORTIC ANEURYSM) WITHOUT RUPTURE (HCC): ICD-10-CM

## 2020-01-23 DIAGNOSIS — E66.01 CLASS 2 SEVERE OBESITY DUE TO EXCESS CALORIES WITH SERIOUS COMORBIDITY AND BODY MASS INDEX (BMI) OF 38.0 TO 38.9 IN ADULT (HCC): ICD-10-CM

## 2020-01-23 PROBLEM — E78.5 HYPERLIPIDEMIA LDL GOAL <100: Status: ACTIVE | Noted: 2020-01-23

## 2020-01-23 PROCEDURE — 99214 OFFICE O/P EST MOD 30 MIN: CPT | Performed by: INTERNAL MEDICINE

## 2020-01-23 NOTE — PROGRESS NOTES
Cardiovascular Medicine      Sunil Lee M.D., Ph.D., [facility]             History of Present Illness  This is a 74 y.o. female with:    1.  AI  2. AAA  3.  Risk factors: Former smoker, obese, type 2 diabetes, HTN, HLD--refuses statin  4. Non-cardiac CPS with low-risk MPS and elevated TID ratio 1/2020    Kelly Alvarez is a 74 y.o. female who returns to clinic today.  The patient had an asymptomatic EKG during a wellness visit.  This showed poor R wave progression with the inability to exclude a prior anterior infarction.  She was also having some non--exertional chest pain.  She was sent for myocardial perfusion scintigraphy which showed no inducible ischemia.  Her TID ratio was elevated, however.  She does have type 2 diabetes, hypertension and dyslipidemia.  She refuses statin medications.  She does use red yeast rice.  Her blood pressure is managed by her PCP.  She told me that she believes she had been diagnosed with an aneurysm, but did not know where.  I sent her for a screening which showed a AAA.  She has referral placed to vascular surgery.  Lastly, her 2D echocardiogram showed aortic insufficiency.  The aortic valve was not well visualized.  Other than some exertional dyspnea, she is really asymptomatic from a cardiovascular perspective today.      Review of Systems - Review of Systems   Cardiovascular: Positive for dyspnea on exertion. Negative for chest pain, claudication, cyanosis, irregular heartbeat, leg swelling, near-syncope, orthopnea, palpitations, paroxysmal nocturnal dyspnea and syncope.   Respiratory: Positive for shortness of breath. Negative for cough, hemoptysis, sleep disturbances due to breathing, snoring, sputum production and wheezing.         All other systems were reviewed and were negative.    family history includes Alzheimer's disease in her mother; Heart failure in her father.     reports that she has quit smoking. She has never used smokeless tobacco. She reports  that she does not drink alcohol or use drugs.    Allergies   Allergen Reactions   • Ceftriaxone GI Intolerance     vomiting   • Codeine Other (See Comments)     Clammy   • Diazepam Irritability     Muscle weakness   • Levaquin [Levofloxacin] Irritability   • Morphine Irritability     clammy   • Zanaflex [Tizanidine Hcl] Other (See Comments)     Patient cannot remeber type of reaction.         Current Outpatient Medications:   •  ACCU-CHEK SOFTCLIX LANCETS lancets, Accu-Chek Softclix Lancets, Disp: , Rfl:   •  amitriptyline (ELAVIL) 50 MG tablet, Take 50 mg by mouth. 2 hs, Disp: , Rfl:   •  aspirin 81 MG chewable tablet, Chew 81 mg daily., Disp: , Rfl:   •  B Complex Vitamins (VITAMIN B COMPLEX) capsule capsule, Take  by mouth Daily., Disp: , Rfl:   •  cloNIDine (CATAPRES) 0.3 MG tablet, Take 0.3 mg by mouth 2 (two) times a day., Disp: , Rfl:   •  COENZYME Q-10 PO, Take  by mouth., Disp: , Rfl:   •  furosemide (LASIX) 40 MG tablet, Take 40 mg by mouth. prn, Disp: , Rfl:   •  gabapentin (NEURONTIN) 300 MG capsule, 300 mg Every Night. Patient stated she takes 4 at bedtime   Rx reads take 1 capsule in the morning 1 at lunch and 4 capsules at bedtime., Disp: , Rfl:   •  glucose blood test strip, Accu-Chek Maribell Plus Meter, Disp: , Rfl:   •  glucose blood test strip, Accu-Chek Maribell Plus test strips  USE TO TEST BLOOD GLUCOSE ONCE DAILY., Disp: , Rfl:   •  glucose blood test strip, Accu-Chek Maribell Plus test strips  USE TO TEST BLOOD GLUCOSE Twice DAILY., Disp: , Rfl:   •  glucose blood test strip, Accu-Chek Maribell Plus test strips, Disp: , Rfl:   •  HYDROcodone-acetaminophen (NORCO)  MG per tablet, Take 1 tablet by mouth 4 (Four) Times a Day., Disp: 120 tablet, Rfl: 0  •  losartan (COZAAR) 25 MG tablet, Daily., Disp: , Rfl:   •  meclizine (ANTIVERT) 25 MG tablet, meclizine 25 mg tablet  Take 1 tablet 3 times a day by oral route for 30 days., Disp: , Rfl:   •  metFORMIN (GLUCOPHAGE) 1000 MG tablet, 1,000 mg 2 (Two)  "Times a Day., Disp: , Rfl:   •  nystatin-triamcinolone (MYCOLOG II) 603807-1.1 UNIT/GM-% cream, , Disp: , Rfl:   •  Omega-3 Fatty Acids (OMEGA-3 FISH OIL PO), Take  by mouth., Disp: , Rfl:   •  omeprazole (priLOSEC) 20 MG capsule, , Disp: , Rfl:   •  Red Yeast Rice Extract (RED YEAST RICE PO), Take  by mouth., Disp: , Rfl:   •  TURMERIC PO, Take  by mouth., Disp: , Rfl:     Physical Exam:  Vitals:    01/23/20 1300   BP: 168/84   BP Location: Right arm   Patient Position: Sitting   Cuff Size: Adult   Pulse: 95   SpO2: 96%   Weight: 108 kg (237 lb 9.6 oz)   Height: 166.4 cm (65.5\")   PainSc: 0-No pain     Pulse Ox: Normal  on room air  General: alert, appears stated age and cooperative     Body Habitus: obese    HEENT: Head: Normocephalic, no lesions, without obvious abnormality. No arcus senilis, xanthelasma or xanthomas.    Pulses: 2+ and symmetric  JVP: Volume/Pulsation: Normal.  Normal waveforms.   Appropriate inspiratory decrease.  No Kussmaul's. No Tea's.    Pulmonary:Normal     Precordium: Normal impulses. P2 is not palpable.  RV Heave: absent  LV Heave: absent  [place]:  normal size and placement  Palpable S4: absent.  Heart rate: normal    Heart Rhythm: regular     Heart Sounds: S1: normal intensity  S2: normal intensity  S3: absent   S4: absent  Opening Snap: absent    A2-OS:  no  Pericardial Rub:  Absent  Ejection click: None      Murmurs:  absent   Extremity: moves all extremities equally.       DATA REVIEWED:     EKG. I personally reviewed and interpreted the EKG.          Results for orders placed in visit on 11/11/19   Adult Transthoracic Echo Complete W/ Cont if Necessary Per Protocol    Narrative · The study is technically difficult secondary to body habitus.  · Left ventricle systolic function is normal. Calculated LVEF is 69%.   Grade 1A diastolic dysfunction.  · Right ventricle systolic function is normal.  · The aortic valve is not well-visualized, but there is at least trace   aortic " insufficiency.            MPS, 1/2020                          Duplex AA, 1/2020      --------------------------------------------------------------------------------------------------  LABS:     The 10-year CVD risk score (Sabi et al., 2008) is: 45.7%    Values used to calculate the score:      Age: 74 years      Sex: Female      Diabetic: Yes      Tobacco smoker: No      Systolic Blood Pressure: 140 mmHg      Is BP treated: Yes      HDL Cholesterol: 36 mg/dL      Total Cholesterol: 225 mg/dL         Lab Results   Component Value Date    GLUCOSE 100 (H) 09/24/2019    BUN 19 09/24/2019    CREATININE 0.84 09/24/2019    EGFRIFNONA 66 09/24/2019    BCR 22.6 09/24/2019    K 5.0 09/24/2019    CO2 29.0 09/24/2019    CALCIUM 9.6 09/24/2019    ALBUMIN 4.60 09/24/2019    AST 25 09/24/2019    ALT 17 09/24/2019     Lab Results   Component Value Date    WBC 5.26 09/24/2019    HGB 14.8 09/24/2019    HCT 45.5 09/24/2019    MCV 85.8 09/24/2019     09/24/2019     Lab Results   Component Value Date    CHOL 225 (H) 09/24/2019    TRIG 265 (H) 09/24/2019    HDL 36 (L) 09/24/2019     (H) 09/24/2019     Lab Results   Component Value Date    TSH 2.710 09/24/2019     Lab Results   Component Value Date    CKTOTAL 146 (H) 12/05/2016    CKMB 1.6 12/05/2016    TROPONINI <0.012 12/05/2016     Lab Results   Component Value Date    HGBA1C 6.70 (H) 09/24/2019     No results found for: DDIMER  Lab Results   Component Value Date    ALT 17 09/24/2019     Lab Results   Component Value Date    HGBA1C 6.70 (H) 09/24/2019    HGBA1C 7.0 (H) 11/21/2016     Lab Results   Component Value Date    MICROALBUR <1.2 09/24/2019    CREATININE 0.84 09/24/2019     No results found for: IRON, TIBC, FERRITIN  Lab Results   Component Value Date    INR 1.40 (H) 12/15/2016    INR 2.3 (H) 12/05/2016    INR 2.4 (H) 12/01/2016    PROTIME 24.8 (H) 12/05/2016    PROTIME 25.5 (H) 12/01/2016    PROTIME 26.8 (H) 11/30/2016       Assessment/Plan     1.  Nonrheumatic aortic valve insufficiency. ACC stage B. There are no surgical indications at this time. Signs and symptoms of worsening valve disease discussed.  I've asked the patient contact me for an earlier appointment if these develop. The patient has been advised to remain in clinical surveillance. I've recommended a repeat 2D TTE every 3-5 years with the next TTE due: 2023.  • No indication based on 2017 ACC/AHA guidelines for IE prophylaxis for dental procedures: Optimal oral health is recommended through regular professional dental care and the use of appropriate dental products, such as manual, powered, and ultrasonic toothbrushes; dental floss; and other plaque-removal devices    2. AAA (abdominal aortic aneurysm) without rupture (CMS/HCC).   -Screening for first degree relatives was recommended (SVS class I recommendation in ages 65-75 and those older than 75 in good health).  -Tobacco avoidance  -Vascular surgery    3. Cardiac Risk Assessments based on 2019 ACCF guidelines:  -A team-based care approach is recommended for the control of risk factors associated with ASCAD.  As such, Kelly Alvarez was requested to have ongoing follow-up with their PCP.    Continue follow-up visits with PCP for monitoring of labs; diet emphasizing intake of vegetables, fruits, nuts, whole grains and fish is recommended. Physical activity recommendations were provided.   Essential HTN is a significant risk factor for stroke, heart disease and vascular disease. I've recommended the patient continue current medications, if any, as prescribed by the primary care provider. I recommended they have close follow-up for ongoing mgmt of this and the medical comorbidities associated with HTN with their PCP.  They were also provided with information regarding maintaining a healthy weight, heart-healthy dietary pattern DASH information.  Goal blood pressure less than 130/80.  The patient's BMI is recommended to be calculated at  least annually.  The patient's BMI is Body mass index is 38.94 kg/m²..  This places the patient in weight class:  Obese Class II: 35-39.9kg/m2. They have been asked to have close PCP follow-up.  Tobacco status assessed at every visits.  The patient's nicotine status: is a former smoker    4.  Likely non-cardiac chest pain with MPS without evidence of inducible ischemia and elevated TID ratio.  I went over the results of her myocardial perfusion scintigraphy.  I did pull up her images and showed her her pictures.  The NPV of this test is as high as 98% was explained to the patient in terms she can understand.  I also discussed her elevated TID ratio in the absence of inducible ischemia is likely related to hypertension and/or diabetes.  I have encouraged her to contact me for any concerning changes in her symptoms.    I offered her valvular surveillance, but she declined due to transportation issues. We discussed having her PCP follow-up with serial TTEs.

## 2020-01-23 NOTE — PATIENT INSTRUCTIONS
Aortic Insufficiency    Aortic insufficiency is a condition that happens when the aortic valve does not close all the way. The aortic valve is a gate-like structure between the lower left chamber of the heart (left ventricle) and the main blood vessel that supplies blood to the rest of the body (aorta). The aortic valve opens when the left ventricle squeezes to pump blood into the aorta, and it closes when the left ventricle relaxes.  In aortic insufficiency, which may also be called aortic regurgitation, blood in the aorta leaks through the aortic valve after it has closed. This causes the heart to work harder than usual. If aortic insufficiency is not treated, it causes enlargement and weakening of the left ventricle. This can result in heart failure, abnormal heart rhythms (arrhythmias), and other dangerous conditions. If this condition develops suddenly, it may need to be treated with emergency surgery.  What are the causes?  This condition may be caused by anything that weakens the aortic valve, such as:  · Severe high blood pressure (hypertension).  · Infection of the inner lining of the heart or the heart valves (endocarditis).  · A ballooning of a weak spot in the aorta wall (aortic aneurysm).  · A tear or separation of the inner walls of the aorta (aortic dissection).  · Injury (trauma) that damages the aortic valve.  · Certain medicines.  · Disease of a protein in the body called collagen (collagen vascular disease).  · A heart problem (bicuspid aortic valve) that is present at birth (congenital).  · An inflammatory condition that can develop after an untreated strep throat infection (rheumatic fever).  · Complications during or after a heart surgery. This is rare.  What are the signs or symptoms?  Symptoms of this condition include:  · Fatigue.  · Shortness of breath.  · Difficulty breathing while lying flat (orthopnea). You may need to sleep on two or more pillows to breathe better.  · Chest discomfort  (angina).  · Head bobbing.  · A fluttering feeling in the chest (palpitations).  · An irregular or faster-than-normal heartbeat.  Symptoms usually develop gradually, unless this condition was caused by a major injury or by endocarditis.  How is this diagnosed?  This condition is diagnosed based on:  · A physical exam.  · An imaging test that uses sound waves to produce images of the heart (echocardiogram).  You may also have other tests to confirm the diagnosis, including:  · Chest X-ray.  · MRI.  · A test that records the electrical impulses of the heart (electrocardiogram, ECG).  · CT angiogram (CTA). In this procedure, a large X-ray machine, called a CT scanner, takes detailed pictures of blood vessels after dye has been injected into the vessels.  · Aortic angiogram. In this procedure, X-ray images are taken after dye has been injected into blood vessels. This tests the function of the aorta.  How is this treated?  Treatment depends on your symptoms, how severe the condition is, and what problems the condition is causing. Treatment may include:  · Observation. If your condition is mild, you may not need treatment. However, you will need to have your condition checked regularly to make sure it is not getting worse or causing serious problems.  · Medicines that help the heart work more efficiently.  · Surgery to repair or replace the valve, in severe cases. Surgery is usually recommended if the left ventricle enlarges beyond a certain point. If aortic insufficiency occurs suddenly, surgery may be needed immediately.  Follow these instructions at home:  · Take over-the-counter and prescription medicines only as told by your health care provider.  · Do not use any products that contain nicotine or tobacco, such as cigarettes, e-cigarettes, and chewing tobacco. If you need help quitting, ask your health care provider.  · If directed by your health care provider, avoid heavy weight lifting and contact sports such as  football.  · Follow instructions from your health care provider about eating or drinking restrictions. Your health care provider may recommend that you:  ? Limit alcohol use to:  § 0-1 drink a day for women.  § 0-2 drinks a day for men.  ? Be aware of how much alcohol is in your drink. In the [place] one drink equals one 12 oz bottle of beer (355 mL), one 5 oz glass of wine (148 mL), or one 1½ oz glass of hard liquor (44 mL).  ? Eat foods that are high in fiber, such as fresh fruits and vegetables, whole grains, and beans.  ? Eat less salt (sodium) and salty foods. Check ingredients and nutrition facts on packaged foods and beverages.  · Keep all follow-up visits as told by your health care provider. This is important. You may need regular tests to monitor your condition and check how well your heart is pumping blood.  Contact a health care provider if:  · Your angina symptoms are more frequent or seem to be getting worse.  · Your breathing problems seem to be getting worse.  · You feel dizzy or close to fainting.  · You have swelling in your feet, ankles, legs, or abdomen.  · You urinate more than usual during the night (nocturia).  · You have an unexplained fever that lasts 2 days or longer.  · You develop new symptoms.  Get help right away if you:  · Have severe chest pain.  · Have severe shortness of breath.  · Feel rapid or irregular heartbeats.  · Feel light-headed or you faint.  · Have sudden, unexplained weight gain.  Summary  · Aortic insufficiency is a condition in which the aortic valve does not close all the way. This causes the heart to work harder than usual.  · This condition may be treated with observation, medicines, or surgery.  · Take over-the-counter and prescription medicines only as told by your health care provider.  · Eat less salt (sodium) and salty foods. Check ingredients and nutrition facts on packaged foods and beverages.  · Get help right away if you have severe chest pain, shortness of  breath, irregular heartbeats, sudden weight gain, or if you feel light-headed or you faint.  This information is not intended to replace advice given to you by your health care provider. Make sure you discuss any questions you have with your health care provider.  Document Released: 06/23/2004 Document Revised: 09/10/2019 Document Reviewed: 09/10/2019  [employer] Interactive Patient Education © 2019 [employer]      Preventing Unhealthy Weight Gain, Adult  Staying at a healthy weight is important to your overall health. When fat builds up in your body, you may become overweight or obese. Being overweight or obese increases your risk of developing certain health problems, such as heart disease, diabetes, sleeping problems, joint problems, and some types of cancer.  Unhealthy weight gain is often the result of making unhealthy food choices or not getting enough exercise. You can make changes to your lifestyle to prevent obesity and stay as healthy as possible.  What nutrition changes can be made?    · Eat only as much as your body needs. To do this:  ? Pay attention to signs that you are hungry or full. Stop eating as soon as you feel full.  ? If you feel hungry, try drinking water first before eating. Drink enough water so your urine is clear or pale yellow.  ? Eat smaller portions. Pay attention to portion sizes when eating out.  ? Look at serving sizes on food labels. Most foods contain more than one serving per container.  ? Eat the recommended number of calories for your gender and activity level. For most active people, a daily total of 2,000 calories is appropriate. If you are trying to lose weight or are not very active, you may need to eat fewer calories. Talk with your health care provider or a diet and nutrition specialist (dietitian) about how many calories you need each day.  · Choose healthy foods, such as:  ? Fruits and vegetables. At each meal, try to fill at least half of your plate with fruits and  vegetables.  ? Whole grains, such as whole-wheat bread, brown rice, and quinoa.  ? Lean meats, such as chicken or fish.  ? Other healthy proteins, such as beans, eggs, or tofu.  ? Healthy fats, such as nuts, seeds, fatty fish, and olive oil.  ? Low-fat or fat-free dairy products.  · Check food labels, and avoid food and drinks that:  ? Are high in calories.  ? Have added sugar.  ? Are high in sodium.  ? Have saturated fats or trans fats.  · Cook foods in healthier ways, such as by baking, broiling, or grilling.  · Make a meal plan for the week, and shop with a grocery list to help you stay on track with your purchases. Try to avoid going to the grocery store when you are hungry.  · When grocery shopping, try to shop around the outside of the store first, where the fresh foods are. Doing this helps you to avoid prepackaged foods, which can be high in sugar, salt (sodium), and fat.  What lifestyle changes can be made?    · Exercise for 30 or more minutes on 5 or more days each week. Exercising may include brisk walking, yard work, biking, running, swimming, and team sports like basketball and soccer. Ask your health care provider which exercises are safe for you.  · Do muscle-strengthening activities, such as lifting weights or using resistance bands, on 2 or more days a week.  · Do not use any products that contain nicotine or tobacco, such as cigarettes and e-cigarettes. If you need help quitting, ask your health care provider.  · Limit alcohol intake to no more than 1 drink a day for nonpregnant women and 2 drinks a day for men. One drink equals 12 oz of beer, 5 oz of wine, or 1½ oz of hard liquor.  · Try to get 7-9 hours of sleep each night.  What other changes can be made?  · Keep a food and activity journal to keep track of:  ? What you ate and how many calories you had. Remember to count the calories in sauces, dressings, and side dishes.  ? Whether you were active, and what exercises you did.  ? Your calorie,  weight, and activity goals.  · Check your weight regularly. Track any changes. If you notice you have gained weight, make changes to your diet or activity routine.  · Avoid taking weight-loss medicines or supplements. Talk to your health care provider before starting any new medicine or supplement.  · Talk to your health care provider before trying any new diet or exercise plan.  Why are these changes important?  Eating healthy, staying active, and having healthy habits can help you to prevent obesity. Those changes also:  · Help you manage stress and emotions.  · Help you connect with friends and family.  · Improve your self-esteem.  · Improve your sleep.  · Prevent long-term health problems.  What can happen if changes are not made?  Being obese or overweight can cause you to develop joint or bone problems, which can make it hard for you to stay active or do activities you enjoy. Being obese or overweight also puts stress on your heart and lungs and can lead to health problems like diabetes, heart disease, and some cancers.  Where to find more information  Talk with your health care provider or a dietitian about healthy eating and healthy lifestyle choices. You may also find information from:  · [place] Department of Agriculture, MyPlate: www.choosemyplate.gov  · American Heart Association: www.heart.org  · Centers for Disease Control and Prevention: www.cdc.gov  Summary  · Staying at a healthy weight is important to your overall health. It helps you to prevent certain diseases and health problems, such as heart disease, diabetes, joint problems, sleep disorders, and some types of cancer.  · Being obese or overweight can cause you to develop joint or bone problems, which can make it hard for you to stay active or do activities you enjoy.  · You can prevent unhealthy weight gain by eating a healthy diet, exercising regularly, not smoking, limiting alcohol, and getting enough sleep.  · Talk with your health care  provider or a dietitian for guidance about healthy eating and healthy lifestyle choices.  This information is not intended to replace advice given to you by your health care provider. Make sure you discuss any questions you have with your health care provider.  Document Released: 12/19/2017 Document Revised: 09/28/2018 Document Reviewed: 01/24/2018  [employer] Interactive Patient Education © 2019 [employer]      Abdominal Aortic Aneurysm    An aneurysm is a bulge in one of the blood vessels that carry blood away from the heart (artery). It happens when blood pushes up against a weak or damaged place in the wall of an artery. An abdominal aortic aneurysm happens in the main artery of the body (aorta).  Some aneurysms may not cause problems. If it grows, it can burst or tear, causing bleeding inside the body. This is an emergency. It needs to be treated right away.  What are the causes?  The exact cause of this condition is not known.  What increases the risk?  The following may make you more likely to get this condition:  · Being a male who is 60 years of age or older.  · Being white ().  · Using tobacco.  · Having a family history of aneurysms.  · Having the following conditions:  ? Hardening of the arteries (arteriosclerosis).  ? Inflammation of the walls of an artery (arteritis).  ? Certain genetic conditions.  ? Being very overweight (obesity).  ? An infection in the wall of the aorta (infectious aortitis).  ? High cholesterol.  ? High blood pressure (hypertension).  What are the signs or symptoms?  Symptoms depend on the size of the aneurysm and how fast it is growing. Most grow slowly and do not cause any symptoms. If symptoms do occur, they may include:  · Pain in the belly (abdomen), side, or back.  · Feeling full after eating only small amounts of food.  · Feeling a throbbing lump in the belly.  Symptoms that the aneurysm has burst (ruptured) include:  · Sudden, very bad pain in the belly, side, or  back.  · Feeling sick to your stomach (nauseous).  · Throwing up (vomiting).  · Feeling light-headed or passing out.  How is this treated?  Treatment for this condition depends on:  · The size of the aneurysm.  · How fast it is growing.  · Your age.  · Your risk of having it burst.  If your aneurysm is smaller than 2 inches (5 cm), your doctor may manage it by:  · Checking it often to see if it is getting bigger. You may have an imaging test (ultrasound) to check it every 3-6 months, every year, or every few years.  · Giving you medicines to:  ? Control blood pressure.  ? Treat pain.  ? Fight infection.  If your aneurysm is larger than 2 inches (5 cm), you may need surgery to fix it.  Follow these instructions at home:  Lifestyle  · Do not use any products that have nicotine or tobacco in them. This includes cigarettes, e-cigarettes, and chewing tobacco. If you need help quitting, ask your doctor.  · Get regular exercise. Ask your doctor what types of exercise are best for you.  Eating and drinking  · Eat a heart-healthy diet. This includes eating plenty of:  ? Fresh fruits and vegetables.  ? Whole grains.  ? Low-fat (lean) protein.  ? Low-fat dairy products.  · Avoid foods that are high in saturated fat and cholesterol. These foods include red meat and some dairy products.  · Do not drink alcohol if:  ? Your doctor tells you not to drink.  ? You are pregnant, may be pregnant, or are planning to become pregnant.  · If you drink alcohol:  ? Limit how much you use to:  § 0-1 drink a day for women.  § 0-2 drinks a day for men.  ? Be aware of how much alcohol is in your drink. In the [place] one drink equals any of these:  § One typical bottle of beer (12 oz).  § One-half glass of wine (5 oz).  § One shot of hard liquor (1½ oz).  General instructions  · Take over-the-counter and prescription medicines only as told by your doctor.  · Keep your blood pressure within normal limits. Ask your doctor what your blood pressure  should be.  · Have your blood sugar (glucose) level and cholesterol levels checked regularly. Keep your blood sugar level and cholesterol levels within normal limits.  · Avoid heavy lifting and activities that take a lot of effort. Ask your doctor what activities are safe for you.  · Keep all follow-up visits as told by your doctor. This is important.  ? Talk to your doctor about regular screenings to see if the aneurysm is getting bigger.  Contact a doctor if you:  · Have pain in your belly, side, or back.  · Have a throbbing feeling in your belly.  · Have a family history of aneurysms.  Get help right away if you:  · Have sudden, bad pain in your belly, side, or back.  · Feel sick to your stomach.  · Throw up.  · Have trouble pooping (constipation).  · Have trouble peeing (urinating).  · Feel light-headed.  · Have a fast heart rate when you stand.  · Have sweaty skin that is cold to the touch (clammy).  · Have shortness of breath.  · Have a fever.  These symptoms may be an emergency. Do not wait to see if the symptoms will go away. Get medical help right away. Call your local emergency services (911 in the [place] Do not drive yourself to the hospital.  Summary  · An aneurysm is a bulge in one of the blood vessels that carry blood away from the heart (artery). Some aneurysms may not cause problems.  · You may need to have yours checked often. If it grows, it can burst or tear. This causes bleeding inside the body. It needs to be treated right away.  · Follow instructions from your doctor about healthy lifestyle changes.  · Keep all follow-up visits as told by your doctor. This is important.  This information is not intended to replace advice given to you by your health care provider. Make sure you discuss any questions you have with your health care provider.  Document Released: 04/14/2014 Document Revised: 07/27/2019 Document Reviewed: 07/27/2019  [employer] Interactive Patient Education © 2019 [employer]

## 2021-03-23 DIAGNOSIS — I71.40 AAA (ABDOMINAL AORTIC ANEURYSM) WITHOUT RUPTURE (HCC): Primary | ICD-10-CM
